# Patient Record
Sex: MALE | Race: WHITE | NOT HISPANIC OR LATINO | URBAN - METROPOLITAN AREA
[De-identification: names, ages, dates, MRNs, and addresses within clinical notes are randomized per-mention and may not be internally consistent; named-entity substitution may affect disease eponyms.]

---

## 2017-06-03 ENCOUNTER — INPATIENT (INPATIENT)
Facility: HOSPITAL | Age: 22
LOS: 9 days | Discharge: HOME | End: 2017-06-13
Attending: PSYCHIATRY & NEUROLOGY | Admitting: PSYCHIATRY & NEUROLOGY
Payer: COMMERCIAL

## 2017-06-03 VITALS — WEIGHT: 259.93 LBS | HEIGHT: 73 IN

## 2017-06-03 DIAGNOSIS — F12.10 CANNABIS ABUSE, UNCOMPLICATED: ICD-10-CM

## 2017-06-03 DIAGNOSIS — F31.13 BIPOLAR DISORDER, CURRENT EPISODE MANIC WITHOUT PSYCHOTIC FEATURES, SEVERE: ICD-10-CM

## 2017-06-03 DIAGNOSIS — R69 ILLNESS, UNSPECIFIED: ICD-10-CM

## 2017-06-03 LAB
ALBUMIN SERPL ELPH-MCNC: 4.5 G/DL — SIGNIFICANT CHANGE UP (ref 3.3–5)
ALP SERPL-CCNC: 74 U/L — SIGNIFICANT CHANGE UP (ref 40–120)
ALT FLD-CCNC: 54 U/L — SIGNIFICANT CHANGE UP (ref 12–78)
AMPHET UR-MCNC: NEGATIVE — SIGNIFICANT CHANGE UP
ANION GAP SERPL CALC-SCNC: 6 MMOL/L — SIGNIFICANT CHANGE UP (ref 5–17)
APAP SERPL-MCNC: < 2 UG/ML (ref 10–30)
APPEARANCE UR: (no result)
AST SERPL-CCNC: 31 U/L — SIGNIFICANT CHANGE UP (ref 15–37)
BACTERIA # UR AUTO: (no result)
BARBITURATES UR SCN-MCNC: NEGATIVE — SIGNIFICANT CHANGE UP
BASOPHILS # BLD AUTO: 0.1 K/UL — SIGNIFICANT CHANGE UP (ref 0–0.2)
BASOPHILS NFR BLD AUTO: 1.2 % — SIGNIFICANT CHANGE UP (ref 0–2)
BENZODIAZ UR-MCNC: NEGATIVE — SIGNIFICANT CHANGE UP
BILIRUB SERPL-MCNC: 0.6 MG/DL — SIGNIFICANT CHANGE UP (ref 0.2–1.2)
BILIRUB UR-MCNC: NEGATIVE — SIGNIFICANT CHANGE UP
BUN SERPL-MCNC: 12 MG/DL — SIGNIFICANT CHANGE UP (ref 7–23)
CALCIUM SERPL-MCNC: 9.5 MG/DL — SIGNIFICANT CHANGE UP (ref 8.5–10.1)
CHLORIDE SERPL-SCNC: 108 MMOL/L — SIGNIFICANT CHANGE UP (ref 96–108)
CO2 SERPL-SCNC: 28 MMOL/L — SIGNIFICANT CHANGE UP (ref 22–31)
COCAINE METAB.OTHER UR-MCNC: NEGATIVE — SIGNIFICANT CHANGE UP
COLOR SPEC: YELLOW — SIGNIFICANT CHANGE UP
COMMENT - URINE: SIGNIFICANT CHANGE UP
CREAT SERPL-MCNC: 0.96 MG/DL — SIGNIFICANT CHANGE UP (ref 0.5–1.3)
DIFF PNL FLD: NEGATIVE — SIGNIFICANT CHANGE UP
EOSINOPHIL # BLD AUTO: 0.2 K/UL — SIGNIFICANT CHANGE UP (ref 0–0.5)
EOSINOPHIL NFR BLD AUTO: 2.1 % — SIGNIFICANT CHANGE UP (ref 0–6)
EPI CELLS # UR: SIGNIFICANT CHANGE UP
ETHANOL SERPL-MCNC: <10 MG/DL — SIGNIFICANT CHANGE UP (ref 0–10)
GLUCOSE SERPL-MCNC: 88 MG/DL — SIGNIFICANT CHANGE UP (ref 70–99)
GLUCOSE UR QL: NEGATIVE MG/DL — SIGNIFICANT CHANGE UP
HCT VFR BLD CALC: 50.2 % — HIGH (ref 39–50)
HGB BLD-MCNC: 16.7 G/DL — SIGNIFICANT CHANGE UP (ref 13–17)
KETONES UR-MCNC: NEGATIVE — SIGNIFICANT CHANGE UP
LEUKOCYTE ESTERASE UR-ACNC: NEGATIVE — SIGNIFICANT CHANGE UP
LITHIUM SERPL-MCNC: 1.1 MMOL/L — SIGNIFICANT CHANGE UP (ref 0.6–1.2)
LYMPHOCYTES # BLD AUTO: 1.7 K/UL — SIGNIFICANT CHANGE UP (ref 1–3.3)
LYMPHOCYTES # BLD AUTO: 14.8 % — SIGNIFICANT CHANGE UP (ref 13–44)
MCHC RBC-ENTMCNC: 28.4 PG — SIGNIFICANT CHANGE UP (ref 27–34)
MCHC RBC-ENTMCNC: 33.3 GM/DL — SIGNIFICANT CHANGE UP (ref 32–36)
MCV RBC AUTO: 85.2 FL — SIGNIFICANT CHANGE UP (ref 80–100)
METHADONE UR-MCNC: NEGATIVE — SIGNIFICANT CHANGE UP
MONOCYTES # BLD AUTO: 0.8 K/UL — SIGNIFICANT CHANGE UP (ref 0–0.9)
MONOCYTES NFR BLD AUTO: 6.9 % — SIGNIFICANT CHANGE UP (ref 2–14)
NEUTROPHILS # BLD AUTO: 8.7 K/UL — HIGH (ref 1.8–7.4)
NEUTROPHILS NFR BLD AUTO: 74.9 % — SIGNIFICANT CHANGE UP (ref 43–77)
NITRITE UR-MCNC: NEGATIVE — SIGNIFICANT CHANGE UP
OPIATES UR-MCNC: NEGATIVE — SIGNIFICANT CHANGE UP
PCP SPEC-MCNC: SIGNIFICANT CHANGE UP
PCP UR-MCNC: NEGATIVE — SIGNIFICANT CHANGE UP
PH UR: 8 — SIGNIFICANT CHANGE UP (ref 5–8)
PLATELET # BLD AUTO: 246 K/UL — SIGNIFICANT CHANGE UP (ref 150–400)
POTASSIUM SERPL-MCNC: 4 MMOL/L — SIGNIFICANT CHANGE UP (ref 3.5–5.3)
POTASSIUM SERPL-SCNC: 4 MMOL/L — SIGNIFICANT CHANGE UP (ref 3.5–5.3)
PROT SERPL-MCNC: 7.6 GM/DL — SIGNIFICANT CHANGE UP (ref 6–8.3)
PROT UR-MCNC: NEGATIVE MG/DL — SIGNIFICANT CHANGE UP
RBC # BLD: 5.89 M/UL — HIGH (ref 4.2–5.8)
RBC # FLD: 12.5 % — SIGNIFICANT CHANGE UP (ref 10.3–14.5)
RBC CASTS # UR COMP ASSIST: SIGNIFICANT CHANGE UP /HPF (ref 0–4)
SALICYLATES SERPL-MCNC: 1.9 MG/DL — LOW (ref 2.8–20)
SODIUM SERPL-SCNC: 142 MMOL/L — SIGNIFICANT CHANGE UP (ref 135–145)
SP GR SPEC: 1.01 — SIGNIFICANT CHANGE UP (ref 1.01–1.02)
THC UR QL: POSITIVE — SIGNIFICANT CHANGE UP
UROBILINOGEN FLD QL: NEGATIVE MG/DL — SIGNIFICANT CHANGE UP
WBC # BLD: 11.6 K/UL — HIGH (ref 3.8–10.5)
WBC # FLD AUTO: 11.6 K/UL — HIGH (ref 3.8–10.5)
WBC UR QL: SIGNIFICANT CHANGE UP

## 2017-06-03 PROCEDURE — 93010 ELECTROCARDIOGRAM REPORT: CPT

## 2017-06-03 PROCEDURE — 99285 EMERGENCY DEPT VISIT HI MDM: CPT

## 2017-06-03 PROCEDURE — 90792 PSYCH DIAG EVAL W/MED SRVCS: CPT | Mod: GT

## 2017-06-03 RX ORDER — HALOPERIDOL DECANOATE 100 MG/ML
5 INJECTION INTRAMUSCULAR EVERY 6 HOURS
Qty: 0 | Refills: 0 | Status: DISCONTINUED | OUTPATIENT
Start: 2017-06-04 | End: 2017-06-13

## 2017-06-03 RX ORDER — IBUPROFEN 200 MG
600 TABLET ORAL EVERY 6 HOURS
Qty: 0 | Refills: 0 | Status: DISCONTINUED | OUTPATIENT
Start: 2017-06-04 | End: 2017-06-06

## 2017-06-03 RX ORDER — NICOTINE POLACRILEX 2 MG
2 GUM BUCCAL
Qty: 0 | Refills: 0 | Status: DISCONTINUED | OUTPATIENT
Start: 2017-06-04 | End: 2017-06-13

## 2017-06-03 RX ORDER — DIPHENHYDRAMINE HCL 50 MG
50 CAPSULE ORAL EVERY 6 HOURS
Qty: 0 | Refills: 0 | Status: DISCONTINUED | OUTPATIENT
Start: 2017-06-04 | End: 2017-06-13

## 2017-06-03 RX ORDER — HALOPERIDOL DECANOATE 100 MG/ML
5 INJECTION INTRAMUSCULAR ONCE
Qty: 0 | Refills: 0 | Status: COMPLETED | OUTPATIENT
Start: 2017-06-03 | End: 2017-06-03

## 2017-06-03 RX ORDER — TRAZODONE HCL 50 MG
50 TABLET ORAL AT BEDTIME
Qty: 0 | Refills: 0 | Status: DISCONTINUED | OUTPATIENT
Start: 2017-06-04 | End: 2017-06-13

## 2017-06-03 RX ORDER — LITHIUM CARBONATE 300 MG/1
600 TABLET, EXTENDED RELEASE ORAL AT BEDTIME
Qty: 0 | Refills: 0 | Status: DISCONTINUED | OUTPATIENT
Start: 2017-06-04 | End: 2017-06-13

## 2017-06-03 RX ORDER — NICOTINE POLACRILEX 2 MG
1 GUM BUCCAL DAILY
Qty: 0 | Refills: 0 | Status: DISCONTINUED | OUTPATIENT
Start: 2017-06-04 | End: 2017-06-13

## 2017-06-03 RX ORDER — ACETAMINOPHEN 500 MG
650 TABLET ORAL EVERY 6 HOURS
Qty: 0 | Refills: 0 | Status: DISCONTINUED | OUTPATIENT
Start: 2017-06-04 | End: 2017-06-13

## 2017-06-03 RX ORDER — NICOTINE POLACRILEX 2 MG
1 GUM BUCCAL DAILY
Qty: 0 | Refills: 0 | Status: DISCONTINUED | OUTPATIENT
Start: 2017-06-03 | End: 2017-06-13

## 2017-06-03 RX ORDER — LITHIUM CARBONATE 300 MG/1
300 TABLET, EXTENDED RELEASE ORAL DAILY
Qty: 0 | Refills: 0 | Status: DISCONTINUED | OUTPATIENT
Start: 2017-06-04 | End: 2017-06-12

## 2017-06-03 RX ADMIN — Medication 1 PATCH: at 21:59

## 2017-06-03 RX ADMIN — HALOPERIDOL DECANOATE 5 MILLIGRAM(S): 100 INJECTION INTRAMUSCULAR at 23:08

## 2017-06-03 NOTE — ED BEHAVIORAL HEALTH ASSESSMENT NOTE - HPI (INCLUDE ILLNESS QUALITY, SEVERITY, DURATION, TIMING, CONTEXT, MODIFYING FACTORS, ASSOCIATED SIGNS AND SYMPTOMS)
Patient is a single, noncaregiver, 22 yo  male, works part-time as PT aide, domiciled with family in a private home, with Bipolar Disorder Type I, previous inpatient psychiatric admissions, long history of Cannabis Abuse (ongoing), with no hx of aggression/violence, with no hx of suicide attempts; hx of minor legal issue (traffic violations),currently seeing a private psychiatrist and a therapist,  BIB parents today from home for   paranoia, manic symptoms, impulsive & disorganized behavior and thinking secondary to self-discontinuing his psychiatric medications 6 months ago, 2 weeks ago returned from [place] (visited sister), and went back to see his psychiatrist and received Abilify Maintena on 5/25/17 and started to take lithium again.     Patient is in an apparent upbeat mood, circumstantial (even tangential at times) and overinclusive with information requiring at times to be redirected/questions repeated. Patient admits that he has been paranoid about neighbors and friends like thinking they have been in his house moving things). he went to [employer] yesterday and bought clothing "I don't know for how much money I have left."      Patient reports to be medication compliant - lithium theraputic at 1.1; received his monthly Abilify Maintenna shot. Admits to cannabis use and last used last night when he was out with friends. 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, with Bipolar Disorder Type I, previous inpatient psychiatric admissions (most recently at [facility] about 6months ago), long history of Cannabis Abuse (ongoing), with no hx of aggression/violence, with no hx of suicide attempts; hx of minor legal issue (traffic violations),currently seeing a private psychiatrist and a therapist, BIB parents today from home for paranoia, manic symptoms, impulsive & disorganized behavior secondary to self-discontinuing his psychiatric medications 6 months ago, taking a Trans-Atlantic flight to [place] where he did a "lot of hash", returned home 2 weeks ago, did not feel well and went back to see his psychiatrist and received Abilify Maintena on 5/25/17 and started to take lithium again.     Patient is in an apparent upbeat mood, circumstantial initially , then becoming tangential as the assessment progresses. He was also overinclusive with information requiring at times to be redirected/questions repeated. Patient admits that he has been paranoid about neighbors and friends like thinking they have been in his house moving things. He went to [employer] yesterday and bought several items of clothing which he did not need, wore one flip-flop he bought out somewhere - "I don't know for how much money I have left." Patient talked about doing "a lot of hash" in [place], going to coffee houses, visiting [facility] and then bringing cheese back in the airplane.  Patient denies suicidal/homicidal ideation.     Patient reports to be medication compliant since he returned from his [place] trip - lithium theraputic at 1.1; received his monthly Abilify Maintenna shot. Admits to cannabis use and last used last night when he was out with friends. 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, with Bipolar Disorder Type I, previous inpatient psychiatric admissions (most recently at [facility] about 6months ago), long history of Cannabis Abuse (ongoing), with no hx of aggression/violence, with no hx of suicide attempts; hx of minor legal issue (traffic violations),currently seeing a private psychiatrist and a therapist, BIB parents today from home for paranoia, manic symptoms, impulsive & disorganized behavior secondary to self-discontinuing his psychiatric medications 6 months ago, taking a Trans-Atlantic flight to [email or web address] where he did a "lot of hash", returned home 2 weeks ago, did not feel well and went back to see his psychiatrist and received Abilify Maintena on 5/25/17 and started to take lithium again.     Patient is in an apparent upbeat mood, circumstantial initially , then becoming tangential as the assessment progresses. He was also overinclusive with information requiring at times to be redirected/questions repeated. Patient admits that he has been paranoid about neighbors and friends like thinking they have been in his house moving things. He went to [employer] yesterday and bought several items of clothing which he did not need, wore one flip-flop he bought out somewhere - "I don't know for how much money I have left." Patient went to multiple garrett earlier and attempted to open and close credit cards. Patient talked about doing "a lot of hash" in [place], going to coffee [employer], visiting [facility] and then bringing cheese back in the airplane.  Patient denies suicidal/homicidal ideation.     Patient reports to be medication compliant since he returned from his [place] trip - lithium theraputic at 1.1; received his monthly Abilify Maintenna shot. Admits to cannabis use and last used last night when he was out with friends. (UTOX '+" for cannabis)     COLLATERAL FROM PARENT : please see separate BH note (NOTE; father has guns at home which he says are locked in a safety box he has acces to. Patient has not access to this. As per father, he is planning on getting the guns out of the house/ Would double check prior to discharge)

## 2017-06-03 NOTE — ED BEHAVIORAL HEALTH ASSESSMENT NOTE - OTHER PAST PSYCHIATRIC HISTORY (INCLUDE DETAILS REGARDING ONSET, COURSE OF ILLNESS, INPATIENT/OUTPATIENT TREATMENT)
- 1st inpatient psych admission at age 15; stays at [facility] (last 3 stays)  - seeing Dr Sanders (psychiatrist) and Dr Judge (therapist) - 1st inpatient psych admission at age 15; one stay at [facility] (last 3 stays)  - long history of cannabis use  - no hx of aggression/violence/suicide attempts   - seeing Dr Sanders (private psychiatrist) and Dr Judge (therapist) at this time

## 2017-06-03 NOTE — ED BEHAVIORAL HEALTH ASSESSMENT NOTE - SUMMARY
Patient is a 20 yo male with Bipolar I, prior psychiatric admissions, with no hx of suicide attempts/aggression/violence/serious legal issues presenting in a manic state with increased goal directed activity, inattentiveness, affect/mood lability, highly impulsive behavior including shopping sprees/spending sprees, poor sleep, paranoid thinking, decline in baseline functioning (unable to work; sent home by boss), with poor judgment and insight, with continued substance use (likely to have been more lately due to vacation in [place]) who is unable to safely meet his basic needs in his current state and needs inpatient level of care. Emergency 9.39 criteria met. Worthy to note that Patient's presentation is also influenced by his medication non-compliance for 6 months (even if he took medications again in the last week, it is not enough to compensate for this long of period of non-treatment), ongoing substance use which likely was more during his [place] coffee shop visits as well as the sleep changes that go along with a transatlantic trip/flight.

## 2017-06-03 NOTE — ED ADULT NURSE REASSESSMENT NOTE - NS ED NURSE REASSESS COMMENT FT1
Received patient from Mee MCINTYRE @ 8113- Pt. is resting in bed- Pt. expressing feelings of Paranoia and Panic, medicated as ordered. Tele psych called and contacted parents. Pending admission to Psych Facility- Pt. not aggressive at this time- CO continues for safety- Will cont to monitor pt. closely- safety maintained
Bloodwork sent.  Patient on 1:1 observation.  EKG done will continue to monitor pt condition.

## 2017-06-03 NOTE — ED BEHAVIORAL HEALTH ASSESSMENT NOTE - PSYCHIATRIC ISSUES AND PLAN (INCLUDE STANDING AND PRN MEDICATION)
lithium 300mg PO in am and 600mg PO in hs (confirm dose; Dad only knew "one pill in am and 2 pills at hs); on Abilify Maintena which he got last week; + PRNs (haldo, Ativan, Benadryl)

## 2017-06-03 NOTE — ED BEHAVIORAL HEALTH ASSESSMENT NOTE - DETAILS
gained 40 pounds on depakote - reported excessive hunger/appetite parents aware of admission  and are in agreement father -depression Unit nurse called for hand off; case signed out to [facility] overnight team for morning hand-off

## 2017-06-03 NOTE — ED BEHAVIORAL HEALTH ASSESSMENT NOTE - DESCRIPTION
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

## 2017-06-03 NOTE — ED ADULT NURSE NOTE - OBJECTIVE STATEMENT
patient states he started new medications, was off meds for a while. states he has been having paranoid thoughts, and events of today very manic. patient went to several garrett, stating he was a private client and needed to speak with his , that he has a million dollar idea, awaiting a new job. patient with flight of ideas. father at bedside, one to one in place for safety. patient denies suicidal or homicidal thoughts.

## 2017-06-03 NOTE — ED ADULT TRIAGE NOTE - CHIEF COMPLAINT QUOTE
pt's father states patient has been acting out, pt has not had suicidal ideation, but seems to be close to having a manic episode, pt has history of bipolar disorder.

## 2017-06-03 NOTE — ED BEHAVIORAL HEALTH NOTE - BEHAVIORAL HEALTH NOTE
Telepsychiatry Encounter  I have visualized that the patient is on an arms-length 1:1.  I have visualized that the patient is in a private space.  I have confirmed with the patient that they understanding and agree to the evaluation being performed via Telepsychiatry.  I have discussed the above with Telepsychiatry Attending Dr. Harden     Records reviewed: CVM, Filiberto, [facility]  Collateral Contact: Lloyd Servin   -NUMBER: [place] ED Telephone     -RELATIONSHIP: Father     -RELIABILITY: No concerns   -OPINION RE PATIENT RELIABILITY: No concerns   -OPINION RE CONCERN FOR DANGEROUSNESS: No concerns     -AFTERCARE ROLE: Willing to assist pt in all areas as needed.   -PSYCHOEDUCATION: Reviewed role of Emergency department, nature of involuntary vs. voluntary hospitalization, support groups for caregivers.    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     -DEPENDENTS: None    -HPI/PAST PSYCH: Pt has a history of four psychiatric hospitalizations since the age of 15, 1 at [facility] and the last three at [facility]. Last hospitalization was October 2015. Pt has no history of SA and has history of alcohol and past daily marijuana use. Upon discharge from  pt began receiving psychiatric services with Dr. Sanders and therapy with psychologist Dr. Delgado. About 6 months ago, pt decided to stop taking his medications and only receive therapy. Pts father reports pt was doing well up until about 2 weeks ago after he came home from visiting his sister in [place] with his brother. Pts father reports at baseline pt is anxious and usually becomes more anxious when getting prepared to go on a trip. When he returned home from this trip, father reported pt began sleeping less and less and became manic. He became grandiose reporting he felt he was Jamil, then became more paranoid reporting that people were taking his belongings in the home asking for the house locks to be changed. On the 25th, pt was able to recognize his symptoms and asked to return to Dr. Sanders. On this day, he received an Abilify injection and was prescribed Lithium and Klonopin. Pts father reports that he believes pt is taking his medication however, gets confused frequently on the amount he should take. On Friday, pt was sent home from work after his boss reported he was “moving too quickly”. Today, pt woke up and went to multiple garrett and attempted to open and close credit cards. He was also paranoid moving all of his belongings in the home asking that the locks be changed as he believed someone was coming into the home and taking his belongings. Pt was BIB his parents to the ED.     -SUICIDALITY: None, as per pts father     -VIOLENCE: None, as per pts father     -ARRESTS: As per pts father, pt received one public urination ticket and one ticket for having marijuana in his car.     -SUBSTANCE: Daily past use of marijuana and uses alcohol on occasion.     -MEDICAL: None      -TODAY’S ED VISIT: Pt presented in the ED as calm, cooperative and behaviorally controlled.     ADDITIONAL HISTORY PROVIDED BY: Lloyd Servin   -HPI:  Pt has a history of four psychiatric hospitalizations since the age of 15, 1 at [facility] and the last three at [facility]. Last hospitalization was October 2015. Pt has no history of SA and has history of alcohol and past daily marijuana use. Upon discharge from  pt began receiving psychiatric services with Dr. Sanders and therapy with psychologist Dr. Delgado. About 6 months ago, pt decided to stop taking his medications and only receive therapy. Pts father reports pt was doing well up until about 2 weeks ago after he came home from visiting his sister in [place] with his brother. Pts father reports at baseline pt is anxious and usually becomes more anxious when getting prepared to go on a trip. When he returned home from this trip, father reported pt began sleeping less and less and became manic. He became grandiose reporting he felt he was Jamil, then became more paranoid reporting that people were taking his belongings in the home asking for the house locks to be changed. On the 25th, pt was able to recognize his symptoms and asked to return to Dr. Sanders. On this day, he received an Abilify injection and was prescribed Lithium and Klonopin. Pts father reports that he believes pt is taking his medication however, gets confused frequently on the amount he should take. On Friday, pt was sent home from work after his boss reported he was “moving too quickly”. Today, pt woke up and went to multiple garrett and attempted to open and close credit cards. He was also paranoid moving all of his belongings in the home asking that the locks be changed as he believed someone was coming into the home and taking his belongings. Pt was BIB his parents to the ED.     -FAMILY HISTORY: Pts father has depression.     -SOCIAL HISTORY:  No access to guns or weapons. Pts father reports that there are rifles in the home that are locked away in the safe. He reports pt does not have access to the key and that he will be removing the safe out of the home after today.   -DISPOSITION: ADMIT-9.39    I have discussed the above information with Telepsychiatry Attending Dr. Harden

## 2017-06-03 NOTE — ED ADULT NURSE NOTE - NS ED NURSE REPORT GIVEN TO FT
Ria Casarez RN - Pt. rewanded as per Protocol- Parents states belongings were taken by them- No acute or physical distress noted at this time- 939 form filled by Dr. Pendleton- Safety maintained
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
## 2017-06-03 NOTE — ED BEHAVIORAL HEALTH ASSESSMENT NOTE - OTHER
parents CVM on the fasetr side but not pressured "I am fine" describes paranoia limited recent trans-atlantic trip with jet lag/sleep-wake disturbance on the faster side but not overtky pressured

## 2017-06-03 NOTE — ED BEHAVIORAL HEALTH ASSESSMENT NOTE - SUICIDE RISK FACTORS
Mood episode/Substance abuse/dependence Unable to engage in safety planning/Substance abuse/dependence/Highly impulsive behavior/Mood episode

## 2017-06-03 NOTE — ED PROVIDER NOTE - OBJECTIVE STATEMENT
20 y/o male with h/o bipolar disorder BIB father for increasing sean and paranoia over the past week.  Pt was last admitted for 10 days at [facility] about 6 months ago for the same.  He has been on and off meds but recently given injection of Abilify and restarted on Lithium.  No SI or HI.  No somatic complaints.

## 2017-06-03 NOTE — ED BEHAVIORAL HEALTH ASSESSMENT NOTE - DESCRIPTION (FIRST USE, LAST USE, QUANTITY, FREQUENCY, DURATION)
years of cigarette use; currently has nicotine patch on social drinker; last drank 2 beers with friends last night; BAL < 10 in ED today. Denies hx of abuse.dependence symptoms reports at most using "everyday, all day"; decreased some use when working tried once in school; denies subsequent use

## 2017-06-03 NOTE — ED BEHAVIORAL HEALTH ASSESSMENT NOTE - SUICIDE PROTECTIVE FACTORS
Identifies reasons for living/Supportive social network or family/Positive therapeutic relationships/Future oriented/Responsibility to family and others

## 2017-06-03 NOTE — ED PROVIDER NOTE - DETAILS:
I, Wai Dowell, performed the initial face to face bedside interview with this patient regarding history of present illness, review of symptoms and relevant past medical, social and family history.  I completed an independent physical examination.  I was the initial provider who evaluated this patient.  The history, relevant review of systems, past medical and surgical history, medical decision making, and physical examination was documented by the scribe in my presence and I attest to the accuracy of the documentation.

## 2017-06-03 NOTE — ED BEHAVIORAL HEALTH ASSESSMENT NOTE - SUBSTANCE ISSUES AND PLAN (INCLUDE STANDING AND PRN MEDICATION)
low clinical indication for CINA/CIWA; no hx of withdrawals; cannabis use only low clinical indication for CINA/CIWA; no hx of withdrawals; cannabis use only. (NOTE; father has guns at home which he says are locked in a safety box he has acces to. Patient has not access to this. As per father, he is planning on getting the guns out of the house/ Would double check prior to discharge)

## 2017-06-03 NOTE — ED BEHAVIORAL HEALTH ASSESSMENT NOTE - RISK ASSESSMENT
Currently at high risk to self as he is in a manic episode, with increased goal directed activity, inattentiveness, affect/mood lability, highly impulsive behavior including shopping sprees/spending sprees, poor sleep, paranoid thinking, decline in baseline functioning (unable to work; sent home by boss), with poor judgment and insight, who is unable to safely meet his basic needs in his current state and needs inpatient level of care.

## 2017-06-03 NOTE — ED BEHAVIORAL HEALTH ASSESSMENT NOTE - MEDICAL ISSUES AND PLAN (INCLUDE STANDING AND PRN MEDICATION)
no active medical issues no active medical issues; nicotine patch with PRN gums as Patient is an avid cigarette user and has a patch on now

## 2017-06-04 RX ORDER — HALOPERIDOL DECANOATE 100 MG/ML
5 INJECTION INTRAMUSCULAR EVERY 12 HOURS
Qty: 0 | Refills: 0 | Status: DISCONTINUED | OUTPATIENT
Start: 2017-06-04 | End: 2017-06-06

## 2017-06-04 RX ADMIN — LITHIUM CARBONATE 300 MILLIGRAM(S): 300 TABLET, EXTENDED RELEASE ORAL at 08:28

## 2017-06-04 RX ADMIN — HALOPERIDOL DECANOATE 5 MILLIGRAM(S): 100 INJECTION INTRAMUSCULAR at 07:58

## 2017-06-04 RX ADMIN — Medication 1 PATCH: at 01:20

## 2017-06-04 RX ADMIN — Medication 2 MILLIGRAM(S): at 08:28

## 2017-06-04 RX ADMIN — Medication 2 MILLIGRAM(S): at 01:24

## 2017-06-04 RX ADMIN — Medication 1 PATCH: at 08:28

## 2017-06-04 RX ADMIN — HALOPERIDOL DECANOATE 5 MILLIGRAM(S): 100 INJECTION INTRAMUSCULAR at 15:57

## 2017-06-04 RX ADMIN — HALOPERIDOL DECANOATE 5 MILLIGRAM(S): 100 INJECTION INTRAMUSCULAR at 21:06

## 2017-06-04 RX ADMIN — Medication 50 MILLIGRAM(S): at 12:05

## 2017-06-04 RX ADMIN — LITHIUM CARBONATE 600 MILLIGRAM(S): 300 TABLET, EXTENDED RELEASE ORAL at 21:06

## 2017-06-04 NOTE — BEHAVIORAL HEALTH ASSESSMENT NOTE - SUMMARY
Patient is a 20 yo male with Bipolar I, prior psychiatric admissions, with no hx of suicide attempts/aggression/violence/serious legal issues presenting in a manic state with increased goal directed activity, inattentiveness, affect/mood lability, highly impulsive behavior including shopping sprees/spending sprees, poor sleep, paranoid thinking, decline in baseline functioning (unable to work; sent home by boss), with poor judgment and insight, with continued substance use (likely to have been more lately due to vacation in [place]) who is unable to safely meet his basic needs in his current state and needs inpatient level of care. Emergency 9.39 criteria met. Worthy to note that Patient's presentation is also influenced by his medication non-compliance for 6 months (even if he took medications again in the last week, it is not enough to compensate for this long of period of non-treatment), ongoing substance use which likely was more during his [place] coffee shop visits as well as the sleep changes that go along with a transatlantic trip/flight.

## 2017-06-04 NOTE — BEHAVIORAL HEALTH ASSESSMENT NOTE - NSBHCHARTREVIEWLAB_PSY_A_CORE FT
CBC Full  -  ( 2017 17:20 )  WBC Count : 11.6 K/uL  Hemoglobin : 16.7 g/dL  Hematocrit : 50.2 %  Platelet Count - Automated : 246 K/uL  Mean Cell Volume : 85.2 fl  Mean Cell Hemoglobin : 28.4 pg  Mean Cell Hemoglobin Concentration : 33.3 gm/dL  Auto Neutrophil # : 8.7 K/uL  Auto Lymphocyte # : 1.7 K/uL  Auto Monocyte # : 0.8 K/uL  Auto Eosinophil # : 0.2 K/uL  Auto Basophil # : 0.1 K/uL  Auto Neutrophil % : 74.9 %  Auto Lymphocyte % : 14.8 %  Auto Monocyte % : 6.9 %  Auto Eosinophil % : 2.1 %  Auto Basophil % : 1.2 %        142  |  108  |  12  ----------------------------<  88  4.0   |  28  |  0.96    Ca    9.5      2017 17:20    TPro  7.6  /  Alb  4.5  /  TBili  0.6  /  DBili  x   /  AST  31  /  ALT  54  /  AlkPhos  74  06-03      Urinalysis Basic - ( 2017 17:23 )    Color: Yellow / Appearance: very cloudy / S.015 / pH: x  Gluc: x / Ketone: Negative  / Bili: Negative / Urobili: Negative mg/dL   Blood: x / Protein: Negative mg/dL / Nitrite: Negative   Leuk Esterase: Negative / RBC: 0-2 /HPF / WBC 0-2   Sq Epi: x / Non Sq Epi: Occasional / Bacteria: Occasional

## 2017-06-04 NOTE — BEHAVIORAL HEALTH ASSESSMENT NOTE - PROBLEM SELECTOR PLAN 3
1. Pt to remain clean and sober  2.Ongoing pt psychoeducation related to inherent dangers of substance use d/o  3.Pt urged to attend therapy groups including those with focus on substance use d/o  4.AA meeting attendance in the evenings encouraged  5.Dual diagnosis treatment encouraged after pt DC from hospital

## 2017-06-04 NOTE — BEHAVIORAL HEALTH ASSESSMENT NOTE - PROBLEM SELECTOR PLAN 2
1. Ongoing pt psychoeducation regarding dangers of substance use and the importance of pt's remaining clean and sober  2.Pt urged to attend therapy groups, including those with a focus on substance use issues  3.To encourage pt to consider dual diagnosis treatment after DC to decrease pt risk of relapse and need for readmission to hospital.

## 2017-06-04 NOTE — BEHAVIORAL HEALTH ASSESSMENT NOTE - NSBHADMITIPSTRENGTH_PSY_A_CORE
Awareness of substance use issues/Creative/In good physical health/Has supportive interpersonal relationships with family, friends or peers/Financial stability/Has access to housing/residential stability/Intelligent/Attempting to realize his/her potential/Has vocational interests or hobbies

## 2017-06-04 NOTE — BEHAVIORAL HEALTH ASSESSMENT NOTE - NSBHMEDSOTHERFT_PSY_A_CORE
MEDICATIONS  (STANDING):  Current Inpatient Medications  nicotine -  14 mG/24Hr(s) Patch 1patch Transdermal daily  nicotine - 21 mG/24Hr(s) Patch 1Patch Transdermal daily  lithium 300milliGRAM(s) Oral daily  lithium 600milliGRAM(s) Oral at bedtime  haloperidol     Tablet 5milliGRAM(s) Oral every 12 hours    MEDICATIONS  (PRN):  nicotine  Polacrilex Gum 2milliGRAM(s) Oral every 2 hours PRN breakthrough cravings  acetaminophen   Tablet 650milliGRAM(s) Oral every 6 hours PRN For Temp greater than 38.5 C (101.3 F)  ibuprofen  Tablet 600milliGRAM(s) Oral every 6 hours PRN moderate pain  haloperidol     Tablet 5milliGRAM(s) Oral every 6 hours PRN agitation  haloperidol    Injectable 5milliGRAM(s) IntraMuscular every 6 hours PRN severe agitation / aggressive behavior  diphenhydrAMINE   Capsule 50milliGRAM(s) Oral every 6 hours PRN Extrapyramidal symptoms  or prophylaxis / agitation  diphenhydrAMINE   Injectable 50milliGRAM(s) IntraMuscular every 6 hours PRN Extrapyramidal symptoms or prophylaxis / agitation  LORazepam     Tablet 2milliGRAM(s) Oral every 6 hours PRN anxiety / agitation  LORazepam   Injectable 2milliGRAM(s) IntraMuscular every 6 hours PRN severe anxiety / agitation  traZODone 50milliGRAM(s) Oral at bedtime PRN insomnia

## 2017-06-04 NOTE — BEHAVIORAL HEALTH ASSESSMENT NOTE - NSBHCHARTREVIEWVS_PSY_A_CORE FT
Vital Signs Last 24 Hrs  T(C): 36.9, Max: 36.9 (06-05 @ 08:27)  T(F): 98.4, Max: 98.4 (06-05 @ 08:27)  HR: 89 (89 - 89)  BP: 142/78 (142/78 - 142/78)  BP(mean): --  RR: 16 (16 - 16)  SpO2: 99% (99% - 99%)

## 2017-06-04 NOTE — BEHAVIORAL HEALTH ASSESSMENT NOTE - NSBHVIOLRISKFACTORFT_PSY_A_CORE
TEL CALL TO PT'S FATHER 6/5/17 TO CONFIRM PT'S FATHER'S REPORT THAT GUNS IN THE HOME HAVE BEEN REMOVED .

## 2017-06-04 NOTE — BEHAVIORAL HEALTH ASSESSMENT NOTE - PATIENT'S CHIEF COMPLAINT
'I just need a shot of haldol then I will be on my way" " So how are things looking? I just need some Haldol in my butt so I can sleep and then I'm good. It's just my neighbor who is bugging my car and out to get me."

## 2017-06-04 NOTE — BEHAVIORAL HEALTH ASSESSMENT NOTE - PROBLEM SELECTOR PLAN 1
1. Resume Lithium 300 mg po q am and 600 mg po qhs   2.Recheck [place] level ( 12 hr level), CMP, CBC , TSH  3.Pt encouraged to attend therapy groups as tolerated  4.Haldol 5 mg po q 12 hrs standing begun  5.Pt gave verbal consent  to writer to contact pt's family and pt's outpatient provider Dr Sanders ( psychiatry) and Dr Merry Klein ( therapist)  6. WRITER TO CONFIRM WITH PT'S FATHER THAT ALL GUNS IN THE HOME HAVE BEEN REMOVED 1. Resume Lithium 300 mg po q am and 600 mg po qhs   2.Recheck [place] level ( 12 hr level), CMP, CBC , TSH  3.Pt encouraged to attend therapy groups as tolerated  4.Haldol 5 mg po q 12 hrs standing begun  5.Pt gave verbal consent  to writer to contact pt's family and pt's outpatient provider Dr Sanders ( psychiatry) and Dr Merry Klein ( therapist)  6. WRITER TO CONFIRM WITH PT'S FATHER THAT ALL GUNS IN THE HOME HAVE BEEN REMOVED  7.Disposition planning ongoing

## 2017-06-04 NOTE — BEHAVIORAL HEALTH ASSESSMENT NOTE - OTHER
CVM on the faster side but not overtky pressured "I am fine" describes paranoia superficially cooperative as pt remains thought disordered and illogical and unable to provide full cooperation coherent , spontaneous but in rushed illogical spurts speech near pressured and at times pt 'tripping' over his words joking, punning, then suddenly tearful pt denies though appears distracted and internally preoccupied at times. rapid and near pressured , difficult to interrupt, non goal-directed " I feel fine. I did a lot of weird stuff recently but it was because of my neighbor. My car is outside. Did you see it? " ( pt pointing to a stranger's car in the hospital parking lot)" paranoid and persecutory delusions, delusions of reference and somatic preoccupations

## 2017-06-04 NOTE — BEHAVIORAL HEALTH ASSESSMENT NOTE - NSBHADMITIPBHPROVFT_PSY_A_CORE
Tel call to Dr Sanders as above ( recent visit from pt on 5/25/17 for Abilify Maintena after pt self DC'd treatment x the past 6 months  Lithium and Klonopin also restarted on 5/25/17

## 2017-06-04 NOTE — BEHAVIORAL HEALTH ASSESSMENT NOTE - DESCRIPTION (FIRST USE, LAST USE, QUANTITY, FREQUENCY, DURATION)
years of cigarette use; currently has nicotine patch on social drinker; last drank 2 beers with friends last night; BAL < 10 in ED today. Denies hx of abuse.dependence symptoms reports at most using "everyday, all day"; decreased some use when working tried once in school; denies subsequent use social drinker; last drank 2 beers with friends on 6/3/17; BAL < 10 in ED today. Denies hx of abuse.dependence symptoms

## 2017-06-04 NOTE — BEHAVIORAL HEALTH ASSESSMENT NOTE - OTHER PAST PSYCHIATRIC HISTORY (INCLUDE DETAILS REGARDING ONSET, COURSE OF ILLNESS, INPATIENT/OUTPATIENT TREATMENT)
Pt has a history of four psychiatric hospitalizations since the age of 15, 1 at [facility] and the last three at [facility]. Last hospitalization was October 2015. Pt has no history of SA and has history of alcohol and past daily marijuana use. Upon discharge from  pt began receiving psychiatric services with Dr. Sanders and therapy with psychologist Dr. Delgado. About 6 months ago, pt decided to stop taking his medications and only receive therapy. Pts father reports pt was doing well up until about 2 weeks ago after he came home from visiting his sister in  [place] with his brother. Pt's father reports at baseline pt is anxious and usually becomes more anxious when getting prepared to go on a trip. When he returned home from this trip, father reported pt began sleeping less and less and became manic. He became grandiose reporting he felt he was Jamil, then became more paranoid reporting that people were taking his belongings in the home asking for the house locks to be changed. On the 25th, pt was able to recognize his symptoms and asked to return to Dr. Sanders. On this day, he received an Abilify injection and was prescribed Lithium and Klonopin. Pt's father reports that he believes pt is taking his medication however, gets confused frequently on the amount he should take. On Friday 6/2/17  the  pt was sent home from work after his boss reported he was “moving too quickly”. On 6/3/17, the day of admission to , the  pt woke up and went to multiple garrett and attempted to open and close credit cards. He was also paranoid , moving all of his belongings in the home asking that the locks be changed as he believed someone was coming into the home and taking his belongings. Pt with impaired judgment and minimal insight into his illness and the need for treatment.

## 2017-06-04 NOTE — BEHAVIORAL HEALTH ASSESSMENT NOTE - DETAILS
gained 40 pounds on depakote - reported excessive hunger/appetite father -depression gained 40 pounds on Depakote - reported excessive hunger/appetite father -reported h/o depression

## 2017-06-04 NOTE — BEHAVIORAL HEALTH ASSESSMENT NOTE - PROBLEM SELECTOR PLAN 4
1.Ongoing pt psychoeducation and support as to importance of treatment compliance in order to decrease the risk of clinical relapse and need for readmission to hospital.  2.Pt encouraged to attend therapy groups including safety planning and diagnosis, medication treatment options etc

## 2017-06-04 NOTE — BEHAVIORAL HEALTH ASSESSMENT NOTE - THOUGHT PROCESS
Overinclusive/Circumstantial/Tangential Disorganized/Flight of ideas/Illogical/Impaired reasoning/Tangential/Overinclusive/Circumstantial

## 2017-06-04 NOTE — BEHAVIORAL HEALTH ASSESSMENT NOTE - NSBHADMITMEDEDUDETAILS_A_CORE FT
face to face informed consent process begun with pt as to risks/ benefits of Lithium, Abilify and Haldol  with discussion of possible switch to Haldol PO and decanoate rather than Abilify as pt more amenable to Haldol treatment

## 2017-06-04 NOTE — BEHAVIORAL HEALTH ASSESSMENT NOTE - NSBHVIOLRISKFACTORS_PSY_A_CORE
Affective dysregulation/Firearm/weapon access/Other.../Substance abuse/Impulsivity/Feeling of being under threat and being unable to control threat/Noncompliance with treatment

## 2017-06-04 NOTE — BEHAVIORAL HEALTH ASSESSMENT NOTE - NSBHADMITTHERAPIESTARGET_PSY_A_CORE FT
to stabilize mood  , to encourage pt to maintain a clean and sober life style  to reenforce  importance for pt treatment compliance

## 2017-06-04 NOTE — BEHAVIORAL HEALTH ASSESSMENT NOTE - NSBHVIOLPROTECTFT_PSY_A_CORE
Pt responds well to treatment but pt is noncompliant with treatment and then clinically relapses as a result

## 2017-06-04 NOTE — BEHAVIORAL HEALTH ASSESSMENT NOTE - NSBHSUICPROTECTFACT_PSY_A_CORE
Positive therapeutic relationships/Supportive social network or family/Responsibility to family and others/Identifies reasons for living/Future oriented/Engaged in work or school

## 2017-06-04 NOTE — BEHAVIORAL HEALTH ASSESSMENT NOTE - HPI (INCLUDE ILLNESS QUALITY, SEVERITY, DURATION, TIMING, CONTEXT, MODIFYING FACTORS, ASSOCIATED SIGNS AND SYMPTOMS)
Patient is a single, noncaregiver, 22 yo  male, works part-time as PT aide, domiciled with family in a private home, with Bipolar Disorder Type I, previous inpatient psychiatric admissions (most recently at [facility] about 6months ago), long history of Cannabis Abuse (ongoing), with no hx of aggression/violence, with no hx of suicide attempts; hx of minor legal issue (traffic violations),currently seeing a private psychiatrist and a therapist, BIB parents today from home for paranoia, manic symptoms, impulsive & disorganized behavior secondary to self-discontinuing his psychiatric medications 6 months ago, taking a Trans[employer] flight to [place] where he did a "lot of hash", returned home 2 weeks ago, did not feel well and went back to see his psychiatrist and received Abilify Maintena on 5/25/17 and started to take lithium again.     Patient is in an apparent upbeat mood, circumstantial initially , then becoming tangential as the assessment progresses. He was also overinclusive with information requiring at times to be redirected/questions repeated. Patient admits that he has been paranoid about neighbors and friends like thinking they have been in his house moving things. He went to [employer] yesterday and bought several items of clothing which he did not need, wore one flip-flop he bought out somewhere - "I don't know for how much money I have left." Patient went to multiple garrett earlier and attempted to open and close credit cards. Patient talked about doing "a lot of hash" in [place], going to coffee [employer], visiting [facility] and then bringing cheese back in the airplane.  Patient denies suicidal/homicidal ideation.     Patient reports to be medication compliant since he returned from his [place] trip - lithium theraputic at 1.1; received his monthly Abilify Maintenna shot. Admits to cannabis use and last used last night when he was out with friends. (UTOX '+" for cannabis)     COLLATERAL FROM PARENT : please see separate BH note (NOTE; father has guns at home which he says are locked in a safety box he has acces to. Patient has not access to this. As per father, he is planning on getting the guns out of the house/ Would double check prior to discharge) Patient is a single, non caregiver, 22 yo  male, works part-time as PT aide, domiciled with family in a private home, with Bipolar Disorder Type I, previous inpatient psychiatric admissions (most recently at [facility] about 6months ago), long history of Cannabis Abuse (ongoing), with no hx of aggression/violence, with no hx of suicide attempts; hx of minor legal issue (traffic violations),currently seeing a private psychiatrist and a therapist, BIB parents today from home for paranoia, manic symptoms, impulsive & disorganized behavior secondary to self-discontinuing his psychiatric medications 6 months ago, taking a Trans-Atlantic flight to [place] where he did a "lot of hash", returned home 2 weeks ago, did not feel well and went back to see his psychiatrist and received Abilify Maintena on 5/25/17 and started to take lithium again.     Patient is in an apparent upbeat mood, circumstantial initially , then becoming tangential as the assessment progresses. He was also overinclusive with information requiring at times to be redirected/questions repeated. Patient admits that he has been paranoid about neighbors and friends like thinking they have been in his house moving things. He went to [employer] yesterday and bought several items of clothing which he did not need, wore one flip-flop he bought out somewhere - "I don't know for how much money I have left." Patient went to multiple garrett earlier and attempted to open and close credit cards. Patient talked about doing "a lot of hash" in [place], going to coffee houses, visiting [facility] and then bringing cheese back in the airplane.  Patient denies suicidal/homicidal ideation.     Patient reports to be medication compliant since he returned from his [place] trip - lithium theraputic at 1.1; received his monthly Abilify Maintenna shot. Admits to cannabis use and last used last night when he was out with friends. (UTOX '+" for cannabis)     COLLATERAL FROM PARENT : please see separate  note (NOTE; father has guns at home which he says are locked in a safety box he has acces to. Patient has not access to this. As per father, he is planning on getting the guns out of the house/ Would double check prior to discharge)

## 2017-06-05 DIAGNOSIS — F10.10 ALCOHOL ABUSE, UNCOMPLICATED: ICD-10-CM

## 2017-06-05 DIAGNOSIS — Z91.19 PATIENT'S NONCOMPLIANCE WITH OTHER MEDICAL TREATMENT AND REGIMEN: ICD-10-CM

## 2017-06-05 DIAGNOSIS — Z91.14 PATIENT'S OTHER NONCOMPLIANCE WITH MEDICATION REGIMEN: ICD-10-CM

## 2017-06-05 DIAGNOSIS — F10.20 ALCOHOL DEPENDENCE, UNCOMPLICATED: ICD-10-CM

## 2017-06-05 DIAGNOSIS — F12.20 CANNABIS DEPENDENCE, UNCOMPLICATED: ICD-10-CM

## 2017-06-05 RX ADMIN — Medication 2 MILLIGRAM(S): at 15:56

## 2017-06-05 RX ADMIN — LITHIUM CARBONATE 300 MILLIGRAM(S): 300 TABLET, EXTENDED RELEASE ORAL at 08:23

## 2017-06-05 RX ADMIN — Medication 50 MILLIGRAM(S): at 22:32

## 2017-06-05 RX ADMIN — Medication 1 PATCH: at 08:27

## 2017-06-05 RX ADMIN — HALOPERIDOL DECANOATE 5 MILLIGRAM(S): 100 INJECTION INTRAMUSCULAR at 20:39

## 2017-06-05 RX ADMIN — HALOPERIDOL DECANOATE 5 MILLIGRAM(S): 100 INJECTION INTRAMUSCULAR at 08:27

## 2017-06-05 RX ADMIN — LITHIUM CARBONATE 600 MILLIGRAM(S): 300 TABLET, EXTENDED RELEASE ORAL at 20:40

## 2017-06-05 RX ADMIN — Medication 2 MILLIGRAM(S): at 11:49

## 2017-06-05 RX ADMIN — Medication 2 MILLIGRAM(S): at 23:34

## 2017-06-05 RX ADMIN — Medication 2 MILLIGRAM(S): at 10:25

## 2017-06-05 RX ADMIN — Medication 1 PATCH: at 08:24

## 2017-06-05 RX ADMIN — Medication 600 MILLIGRAM(S): at 09:20

## 2017-06-05 RX ADMIN — HALOPERIDOL DECANOATE 5 MILLIGRAM(S): 100 INJECTION INTRAMUSCULAR at 03:13

## 2017-06-05 RX ADMIN — Medication 600 MILLIGRAM(S): at 08:23

## 2017-06-06 RX ORDER — HALOPERIDOL DECANOATE 100 MG/ML
10 INJECTION INTRAMUSCULAR AT BEDTIME
Qty: 0 | Refills: 0 | Status: DISCONTINUED | OUTPATIENT
Start: 2017-06-06 | End: 2017-06-13

## 2017-06-06 RX ORDER — BENZTROPINE MESYLATE 1 MG
0.5 TABLET ORAL
Qty: 0 | Refills: 0 | Status: DISCONTINUED | OUTPATIENT
Start: 2017-06-06 | End: 2017-06-13

## 2017-06-06 RX ORDER — HALOPERIDOL DECANOATE 100 MG/ML
5 INJECTION INTRAMUSCULAR DAILY
Qty: 0 | Refills: 0 | Status: DISCONTINUED | OUTPATIENT
Start: 2017-06-07 | End: 2017-06-13

## 2017-06-06 RX ADMIN — LITHIUM CARBONATE 300 MILLIGRAM(S): 300 TABLET, EXTENDED RELEASE ORAL at 08:43

## 2017-06-06 RX ADMIN — Medication 2 MILLIGRAM(S): at 12:12

## 2017-06-06 RX ADMIN — HALOPERIDOL DECANOATE 5 MILLIGRAM(S): 100 INJECTION INTRAMUSCULAR at 08:43

## 2017-06-06 RX ADMIN — Medication 1 PATCH: at 08:44

## 2017-06-06 RX ADMIN — Medication 50 MILLIGRAM(S): at 20:35

## 2017-06-06 RX ADMIN — Medication 600 MILLIGRAM(S): at 05:51

## 2017-06-06 RX ADMIN — Medication 1 PATCH: at 08:46

## 2017-06-06 RX ADMIN — Medication 2 MILLIGRAM(S): at 19:07

## 2017-06-06 RX ADMIN — Medication 50 MILLIGRAM(S): at 11:11

## 2017-06-06 RX ADMIN — HALOPERIDOL DECANOATE 10 MILLIGRAM(S): 100 INJECTION INTRAMUSCULAR at 20:35

## 2017-06-06 RX ADMIN — LITHIUM CARBONATE 600 MILLIGRAM(S): 300 TABLET, EXTENDED RELEASE ORAL at 20:36

## 2017-06-06 NOTE — PROGRESS NOTE BEHAVIORAL HEALTH - OTHER
superficially cooperative as pt remains thought disordered and illogical and unable to provide full cooperation rapid and near pressured , difficult to interrupt, non goal-directed coherent , spontaneous but in rushed illogical spurts speech near pressured and at times pt 'tripping' over his words " I feel fine. I did a lot of weird stuff recently but it was because of my neighbor. My car is outside. Did you see it? " ( pt pointing to a stranger's car in the hospital parking lot)" joking, punning, then suddenly tearful paranoid and persecutory delusions, delusions of reference and somatic preoccupations pt denies though appears distracted and internally preoccupied at times.

## 2017-06-07 RX ADMIN — Medication 1 MILLIGRAM(S): at 13:53

## 2017-06-07 RX ADMIN — HALOPERIDOL DECANOATE 10 MILLIGRAM(S): 100 INJECTION INTRAMUSCULAR at 20:44

## 2017-06-07 RX ADMIN — LITHIUM CARBONATE 600 MILLIGRAM(S): 300 TABLET, EXTENDED RELEASE ORAL at 20:43

## 2017-06-07 RX ADMIN — Medication 650 MILLIGRAM(S): at 17:55

## 2017-06-07 RX ADMIN — HALOPERIDOL DECANOATE 5 MILLIGRAM(S): 100 INJECTION INTRAMUSCULAR at 08:38

## 2017-06-07 RX ADMIN — Medication 1 PATCH: at 08:38

## 2017-06-07 RX ADMIN — Medication 650 MILLIGRAM(S): at 09:21

## 2017-06-07 RX ADMIN — Medication 1 PATCH: at 08:39

## 2017-06-07 RX ADMIN — Medication 50 MILLIGRAM(S): at 08:38

## 2017-06-07 RX ADMIN — LITHIUM CARBONATE 300 MILLIGRAM(S): 300 TABLET, EXTENDED RELEASE ORAL at 08:38

## 2017-06-07 RX ADMIN — Medication 1 PATCH: at 12:41

## 2017-06-07 RX ADMIN — Medication 50 MILLIGRAM(S): at 22:38

## 2017-06-07 RX ADMIN — Medication 600 MILLIGRAM(S): at 06:21

## 2017-06-07 NOTE — PROGRESS NOTE BEHAVIORAL HEALTH - OTHER
superficially cooperative as pt remains thought disordered and illogical and unable to provide full cooperation rapid and near pressured , difficult to interrupt, non goal-directed coherent , spontaneous but in rushed illogical spurts speech near pressured and at times pt 'tripping' over his words " I feel good now. I want to get back to life." joking, punning, then suddenly tearful less overtly paranoid but still grandiose and recklessly blase as to his recent potentially dangerous impulsivity pt denies though appears distracted and internally preoccupied at times.

## 2017-06-07 NOTE — PROGRESS NOTE BEHAVIORAL HEALTH - NSBHCHARTREVIEWIMAGING_PSY_A_CORE FT
MEDICATIONS  (STANDING):  nicotine -  14 mG/24Hr(s) Patch 1patch Transdermal daily  nicotine - 21 mG/24Hr(s) Patch 1Patch Transdermal daily  lithium 300milliGRAM(s) Oral daily  lithium 600milliGRAM(s) Oral at bedtime  haloperidol     Tablet 10milliGRAM(s) Oral at bedtime  haloperidol     Tablet 5milliGRAM(s) Oral daily    MEDICATIONS  (PRN):  nicotine  Polacrilex Gum 2milliGRAM(s) Oral every 2 hours PRN breakthrough cravings  acetaminophen   Tablet 650milliGRAM(s) Oral every 6 hours PRN For Temp greater than 38.5 C (101.3 F)  haloperidol     Tablet 5milliGRAM(s) Oral every 6 hours PRN agitation  haloperidol    Injectable 5milliGRAM(s) IntraMuscular every 6 hours PRN severe agitation / aggressive behavior  diphenhydrAMINE   Capsule 50milliGRAM(s) Oral every 6 hours PRN Extrapyramidal symptoms  or prophylaxis / agitation  diphenhydrAMINE   Injectable 50milliGRAM(s) IntraMuscular every 6 hours PRN Extrapyramidal symptoms or prophylaxis / agitation  LORazepam   Injectable 2milliGRAM(s) IntraMuscular every 6 hours PRN severe anxiety / agitation  traZODone 50milliGRAM(s) Oral at bedtime PRN insomnia  benztropine 0.5milliGRAM(s) Oral two times a day PRN Extrapyramidal symptoms  LORazepam     Tablet 1milliGRAM(s) Oral every 6 hours PRN Anxiety

## 2017-06-08 RX ORDER — DIPHENHYDRAMINE HCL 50 MG
50 CAPSULE ORAL EVERY 6 HOURS
Qty: 0 | Refills: 0 | Status: DISCONTINUED | OUTPATIENT
Start: 2017-06-08 | End: 2017-06-08

## 2017-06-08 RX ADMIN — Medication 1 MILLIGRAM(S): at 09:21

## 2017-06-08 RX ADMIN — HALOPERIDOL DECANOATE 10 MILLIGRAM(S): 100 INJECTION INTRAMUSCULAR at 20:36

## 2017-06-08 RX ADMIN — HALOPERIDOL DECANOATE 5 MILLIGRAM(S): 100 INJECTION INTRAMUSCULAR at 09:22

## 2017-06-08 RX ADMIN — LITHIUM CARBONATE 300 MILLIGRAM(S): 300 TABLET, EXTENDED RELEASE ORAL at 09:21

## 2017-06-08 RX ADMIN — Medication 1 MILLIGRAM(S): at 20:39

## 2017-06-08 RX ADMIN — LITHIUM CARBONATE 600 MILLIGRAM(S): 300 TABLET, EXTENDED RELEASE ORAL at 20:37

## 2017-06-08 NOTE — PROGRESS NOTE BEHAVIORAL HEALTH - NSBHADMITIPOBSFT_PSY_A_CORE
management alert observation status for pt safety management alert observation status for pt safety in light of current manic psychosis with impaired judgment and exit seeking stance

## 2017-06-08 NOTE — PROGRESS NOTE BEHAVIORAL HEALTH - NSBHCHARTREVIEWIMAGING_PSY_A_CORE FT
MEDICATIONS  (STANDING):  nicotine -  14 mG/24Hr(s) Patch 1patch Transdermal daily  nicotine - 21 mG/24Hr(s) Patch 1Patch Transdermal daily  lithium 300milliGRAM(s) Oral daily  lithium 600milliGRAM(s) Oral at bedtime  haloperidol     Tablet 10milliGRAM(s) Oral at bedtime  haloperidol     Tablet 5milliGRAM(s) Oral daily    MEDICATIONS  (PRN):  nicotine  Polacrilex Gum 2milliGRAM(s) Oral every 2 hours PRN breakthrough cravings  acetaminophen   Tablet 650milliGRAM(s) Oral every 6 hours PRN For Temp greater than 38.5 C (101.3 F)  haloperidol     Tablet 5milliGRAM(s) Oral every 6 hours PRN agitation  haloperidol    Injectable 5milliGRAM(s) IntraMuscular every 6 hours PRN severe agitation / aggressive behavior  diphenhydrAMINE   Capsule 50milliGRAM(s) Oral every 6 hours PRN Extrapyramidal symptoms  or prophylaxis / agitation  diphenhydrAMINE   Injectable 50milliGRAM(s) IntraMuscular every 6 hours PRN Extrapyramidal symptoms or prophylaxis / agitation  LORazepam   Injectable 2milliGRAM(s) IntraMuscular every 6 hours PRN severe anxiety / agitation  traZODone 50milliGRAM(s) Oral at bedtime PRN insomnia  benztropine 0.5milliGRAM(s) Oral two times a day PRN Extrapyramidal symptoms  LORazepam     Tablet 1milliGRAM(s) Oral every 6 hours PRN Anxiety MEDICATIONS  (STANDING):  nicotine -  14 mG/24Hr(s) Patch 1patch Transdermal daily  nicotine - 21 mG/24Hr(s) Patch 1Patch Transdermal daily  lithium 300milliGRAM(s) Oral daily  lithium 600milliGRAM(s) Oral at bedtime  haloperidol     Tablet 10milliGRAM(s) Oral at bedtime  haloperidol     Tablet 5milliGRAM(s) Oral daily    MEDICATIONS  (PRN):  nicotine  Polacrilex Gum 2milliGRAM(s) Oral every 2 hours PRN breakthrough cravings  acetaminophen   Tablet 650milliGRAM(s) Oral every 6 hours PRN For Temp greater than 38.5 C (101.3 F)  haloperidol     Tablet 5milliGRAM(s) Oral every 6 hours PRN agitation  haloperidol    Injectable 5milliGRAM(s) IntraMuscular every 6 hours PRN severe agitation / aggressive behavior  diphenhydrAMINE   Capsule 50milliGRAM(s) Oral every 6 hours PRN Extrapyramidal symptoms  or prophylaxis / agitation  diphenhydrAMINE   Injectable 50milliGRAM(s) IntraMuscular every 6 hours PRN Extrapyramidal symptoms or prophylaxis / agitation  traZODone 50milliGRAM(s) Oral at bedtime PRN insomnia  benztropine 0.5milliGRAM(s) Oral two times a day PRN Extrapyramidal symptoms  LORazepam     Tablet 1milliGRAM(s) Oral every 8 hours PRN Agitation

## 2017-06-08 NOTE — PROGRESS NOTE BEHAVIORAL HEALTH - PROBLEM SELECTOR PLAN 3
1. Pt to remain clean and sober  2.Ongoing pt psychoeducation related to inherent dangers of substance use d/o  3.Pt urged to attend therapy groups including those with focus on substance use d/o  4.AA meeting attendance in the evenings encouraged  5.Dual diagnosis treatment encouraged after pt DC from hospital 1. Pt to remain clean and sober  2.Ongoing pt psychoeducation related to inherent dangers of substance use d/o  3.Pt urged to attend therapy groups including those with focus on substance use d/o  4.AA meeting attendance in the evenings encouraged  5.Dual diagnosis treatment encouraged after pt DC from hospital  6. To continue taper of Ativan with plan to DC due to pt h/o significant substance use d/o

## 2017-06-09 RX ADMIN — Medication 50 MILLIGRAM(S): at 12:21

## 2017-06-09 RX ADMIN — Medication 1 MILLIGRAM(S): at 20:52

## 2017-06-09 RX ADMIN — Medication 50 MILLIGRAM(S): at 01:52

## 2017-06-09 RX ADMIN — LITHIUM CARBONATE 300 MILLIGRAM(S): 300 TABLET, EXTENDED RELEASE ORAL at 08:07

## 2017-06-09 RX ADMIN — Medication 0.5 MILLIGRAM(S): at 20:52

## 2017-06-09 RX ADMIN — Medication 650 MILLIGRAM(S): at 12:22

## 2017-06-09 RX ADMIN — HALOPERIDOL DECANOATE 5 MILLIGRAM(S): 100 INJECTION INTRAMUSCULAR at 08:07

## 2017-06-09 RX ADMIN — LITHIUM CARBONATE 600 MILLIGRAM(S): 300 TABLET, EXTENDED RELEASE ORAL at 21:06

## 2017-06-09 RX ADMIN — HALOPERIDOL DECANOATE 10 MILLIGRAM(S): 100 INJECTION INTRAMUSCULAR at 20:52

## 2017-06-09 RX ADMIN — Medication 1 MILLIGRAM(S): at 08:07

## 2017-06-09 NOTE — PROGRESS NOTE BEHAVIORAL HEALTH - OTHER
superficially cooperative as pt remains thought disordered and illogical and unable to provide full cooperation rapid and near pressured , difficult to interrupt, non goal-directed coherent , spontaneous but in rushed illogical spurts speech near pressured and at times pt 'tripping' over his words somewhat overly cheerful and inappropriately nonchalant less frequent joking, punning, then suddenly tearful less overtly paranoid but still  somewhat grandiose and recklessly blase as to his recent potentially dangerous impulsivity pt denies though appears distracted and internally preoccupied at times. slowly improving

## 2017-06-09 NOTE — PROGRESS NOTE BEHAVIORAL HEALTH - NSBHADMITIPOBSFT_PSY_A_CORE
management alert observation status for pt safety in light of current manic psychosis with impaired judgment and exit seeking stance

## 2017-06-09 NOTE — PROGRESS NOTE BEHAVIORAL HEALTH - PROBLEM SELECTOR PLAN 3
1. Pt to remain clean and sober  2.Ongoing pt psychoeducation related to inherent dangers of substance use d/o  3.Pt urged to attend therapy groups including those with focus on substance use d/o  4.AA meeting attendance in the evenings encouraged  5.Dual diagnosis treatment encouraged after pt DC from hospital  6. To continue taper of Ativan with plan to DC due to pt h/o significant substance use d/o

## 2017-06-09 NOTE — PROGRESS NOTE BEHAVIORAL HEALTH - NSBHCHARTREVIEWIMAGING_PSY_A_CORE FT
MEDICATIONS  (STANDING):  nicotine -  14 mG/24Hr(s) Patch 1patch Transdermal daily  nicotine - 21 mG/24Hr(s) Patch 1Patch Transdermal daily  lithium 300milliGRAM(s) Oral daily  lithium 600milliGRAM(s) Oral at bedtime  haloperidol     Tablet 10milliGRAM(s) Oral at bedtime  haloperidol     Tablet 5milliGRAM(s) Oral daily    MEDICATIONS  (PRN):  nicotine  Polacrilex Gum 2milliGRAM(s) Oral every 2 hours PRN breakthrough cravings  acetaminophen   Tablet 650milliGRAM(s) Oral every 6 hours PRN For Temp greater than 38.5 C (101.3 F)  haloperidol     Tablet 5milliGRAM(s) Oral every 6 hours PRN agitation  haloperidol    Injectable 5milliGRAM(s) IntraMuscular every 6 hours PRN severe agitation / aggressive behavior  diphenhydrAMINE   Capsule 50milliGRAM(s) Oral every 6 hours PRN Extrapyramidal symptoms  or prophylaxis / agitation  diphenhydrAMINE   Injectable 50milliGRAM(s) IntraMuscular every 6 hours PRN Extrapyramidal symptoms or prophylaxis / agitation  traZODone 50milliGRAM(s) Oral at bedtime PRN insomnia  benztropine 0.5milliGRAM(s) Oral two times a day PRN Extrapyramidal symptoms  LORazepam     Tablet 1milliGRAM(s) Oral every 8 hours PRN Agitation

## 2017-06-10 LAB
ALBUMIN SERPL ELPH-MCNC: 4.1 G/DL — SIGNIFICANT CHANGE UP (ref 3.3–5)
ALP SERPL-CCNC: 81 U/L — SIGNIFICANT CHANGE UP (ref 40–120)
ALT FLD-CCNC: 57 U/L — SIGNIFICANT CHANGE UP (ref 12–78)
ANION GAP SERPL CALC-SCNC: 6 MMOL/L — SIGNIFICANT CHANGE UP (ref 5–17)
AST SERPL-CCNC: 25 U/L — SIGNIFICANT CHANGE UP (ref 15–37)
BASOPHILS # BLD AUTO: 0.1 K/UL — SIGNIFICANT CHANGE UP (ref 0–0.2)
BASOPHILS NFR BLD AUTO: 1.2 % — SIGNIFICANT CHANGE UP (ref 0–2)
BILIRUB SERPL-MCNC: 0.4 MG/DL — SIGNIFICANT CHANGE UP (ref 0.2–1.2)
BUN SERPL-MCNC: 18 MG/DL — SIGNIFICANT CHANGE UP (ref 7–23)
CALCIUM SERPL-MCNC: 9.7 MG/DL — SIGNIFICANT CHANGE UP (ref 8.5–10.1)
CHLORIDE SERPL-SCNC: 107 MMOL/L — SIGNIFICANT CHANGE UP (ref 96–108)
CO2 SERPL-SCNC: 28 MMOL/L — SIGNIFICANT CHANGE UP (ref 22–31)
CREAT SERPL-MCNC: 0.96 MG/DL — SIGNIFICANT CHANGE UP (ref 0.5–1.3)
EOSINOPHIL # BLD AUTO: 0.4 K/UL — SIGNIFICANT CHANGE UP (ref 0–0.5)
EOSINOPHIL NFR BLD AUTO: 3.7 % — SIGNIFICANT CHANGE UP (ref 0–6)
GLUCOSE SERPL-MCNC: 100 MG/DL — HIGH (ref 70–99)
HCT VFR BLD CALC: 51 % — HIGH (ref 39–50)
HGB BLD-MCNC: 16.9 G/DL — SIGNIFICANT CHANGE UP (ref 13–17)
LITHIUM SERPL-MCNC: 0.5 MMOL/L — LOW (ref 0.6–1.2)
LYMPHOCYTES # BLD AUTO: 2.1 K/UL — SIGNIFICANT CHANGE UP (ref 1–3.3)
LYMPHOCYTES # BLD AUTO: 20.4 % — SIGNIFICANT CHANGE UP (ref 13–44)
MCHC RBC-ENTMCNC: 28.1 PG — SIGNIFICANT CHANGE UP (ref 27–34)
MCHC RBC-ENTMCNC: 33.1 GM/DL — SIGNIFICANT CHANGE UP (ref 32–36)
MCV RBC AUTO: 84.7 FL — SIGNIFICANT CHANGE UP (ref 80–100)
MONOCYTES # BLD AUTO: 0.9 K/UL — SIGNIFICANT CHANGE UP (ref 0–0.9)
MONOCYTES NFR BLD AUTO: 8.6 % — SIGNIFICANT CHANGE UP (ref 2–14)
NEUTROPHILS # BLD AUTO: 6.8 K/UL — SIGNIFICANT CHANGE UP (ref 1.8–7.4)
NEUTROPHILS NFR BLD AUTO: 66.2 % — SIGNIFICANT CHANGE UP (ref 43–77)
PLAT MORPH BLD: NORMAL — SIGNIFICANT CHANGE UP
PLATELET # BLD AUTO: 277 K/UL — SIGNIFICANT CHANGE UP (ref 150–400)
POTASSIUM SERPL-MCNC: 5 MMOL/L — SIGNIFICANT CHANGE UP (ref 3.5–5.3)
POTASSIUM SERPL-SCNC: 5 MMOL/L — SIGNIFICANT CHANGE UP (ref 3.5–5.3)
PROT SERPL-MCNC: 7.5 GM/DL — SIGNIFICANT CHANGE UP (ref 6–8.3)
RBC # BLD: 6.02 M/UL — HIGH (ref 4.2–5.8)
RBC # FLD: 12 % — SIGNIFICANT CHANGE UP (ref 10.3–14.5)
RBC BLD AUTO: NORMAL — SIGNIFICANT CHANGE UP
SODIUM SERPL-SCNC: 141 MMOL/L — SIGNIFICANT CHANGE UP (ref 135–145)
WBC # BLD: 10.3 K/UL — SIGNIFICANT CHANGE UP (ref 3.8–10.5)
WBC # FLD AUTO: 10.3 K/UL — SIGNIFICANT CHANGE UP (ref 3.8–10.5)

## 2017-06-10 RX ADMIN — HALOPERIDOL DECANOATE 10 MILLIGRAM(S): 100 INJECTION INTRAMUSCULAR at 20:15

## 2017-06-10 RX ADMIN — LITHIUM CARBONATE 600 MILLIGRAM(S): 300 TABLET, EXTENDED RELEASE ORAL at 20:16

## 2017-06-10 RX ADMIN — Medication 50 MILLIGRAM(S): at 21:17

## 2017-06-10 RX ADMIN — LITHIUM CARBONATE 300 MILLIGRAM(S): 300 TABLET, EXTENDED RELEASE ORAL at 09:19

## 2017-06-10 RX ADMIN — HALOPERIDOL DECANOATE 5 MILLIGRAM(S): 100 INJECTION INTRAMUSCULAR at 09:19

## 2017-06-11 RX ADMIN — HALOPERIDOL DECANOATE 10 MILLIGRAM(S): 100 INJECTION INTRAMUSCULAR at 20:25

## 2017-06-11 RX ADMIN — LITHIUM CARBONATE 600 MILLIGRAM(S): 300 TABLET, EXTENDED RELEASE ORAL at 20:25

## 2017-06-11 RX ADMIN — Medication 50 MILLIGRAM(S): at 18:04

## 2017-06-11 RX ADMIN — LITHIUM CARBONATE 300 MILLIGRAM(S): 300 TABLET, EXTENDED RELEASE ORAL at 09:42

## 2017-06-11 RX ADMIN — HALOPERIDOL DECANOATE 5 MILLIGRAM(S): 100 INJECTION INTRAMUSCULAR at 09:42

## 2017-06-11 RX ADMIN — Medication 50 MILLIGRAM(S): at 00:12

## 2017-06-11 RX ADMIN — Medication 50 MILLIGRAM(S): at 21:40

## 2017-06-12 RX ORDER — LITHIUM CARBONATE 300 MG/1
600 TABLET, EXTENDED RELEASE ORAL DAILY
Qty: 0 | Refills: 0 | Status: DISCONTINUED | OUTPATIENT
Start: 2017-06-12 | End: 2017-06-13

## 2017-06-12 RX ADMIN — Medication 50 MILLIGRAM(S): at 12:32

## 2017-06-12 RX ADMIN — Medication 50 MILLIGRAM(S): at 02:37

## 2017-06-12 RX ADMIN — LITHIUM CARBONATE 300 MILLIGRAM(S): 300 TABLET, EXTENDED RELEASE ORAL at 09:19

## 2017-06-12 RX ADMIN — HALOPERIDOL DECANOATE 5 MILLIGRAM(S): 100 INJECTION INTRAMUSCULAR at 09:20

## 2017-06-12 RX ADMIN — Medication 50 MILLIGRAM(S): at 23:46

## 2017-06-12 RX ADMIN — Medication 50 MILLIGRAM(S): at 22:05

## 2017-06-12 RX ADMIN — LITHIUM CARBONATE 600 MILLIGRAM(S): 300 TABLET, EXTENDED RELEASE ORAL at 20:33

## 2017-06-12 RX ADMIN — HALOPERIDOL DECANOATE 10 MILLIGRAM(S): 100 INJECTION INTRAMUSCULAR at 20:33

## 2017-06-12 NOTE — PROGRESS NOTE BEHAVIORAL HEALTH - NSBHCHARTREVIEWVS_PSY_A_CORE FT
Vital Signs Last 24 Hrs  T(C): 36.9, Max: 36.9 (06-05 @ 08:27)  T(F): 98.4, Max: 98.4 (06-05 @ 08:27)  HR: 89 (89 - 89)  BP: 142/78 (142/78 - 142/78)  BP(mean): --  RR: 16 (16 - 16)  SpO2: 99% (99% - 99%)
Vital Signs Last 24 Hrs  T(C): 36.4, Max: 36.4 (06-08 @ 08:47)  T(F): 97.5, Max: 97.5 (06-08 @ 08:47)  HR: 72 (72 - 72)  BP: 144/90 (144/90 - 144/90)  BP(mean): --  RR: 16 (16 - 16)  SpO2: 99% (99% - 99%)
Vital Signs Last 24 Hrs  T(C): 36.4, Max: 36.4 (06-12 @ 08:10)  T(F): 97.6, Max: 97.6 (06-12 @ 08:10)  HR: 71 (71 - 71)  BP: 139/90 (139/90 - 139/90)  BP(mean): --  RR: 16 (16 - 16)  SpO2: 99% (99% - 99%)
Vital Signs Last 24 Hrs  T(C): 36.4, Max: 36.4 (06-08 @ 08:47)  T(F): 97.5, Max: 97.5 (06-08 @ 08:47)  HR: 72 (72 - 72)  BP: 144/90 (144/90 - 144/90)  BP(mean): --  RR: 16 (16 - 16)  SpO2: 99% (99% - 99%)
Vital Signs Last 24 Hrs  T(C): 36.9, Max: 36.9 (06-05 @ 08:27)  T(F): 98.4, Max: 98.4 (06-05 @ 08:27)  HR: 89 (89 - 89)  BP: 142/78 (142/78 - 142/78)  BP(mean): --  RR: 16 (16 - 16)  SpO2: 99% (99% - 99%)

## 2017-06-12 NOTE — PROGRESS NOTE BEHAVIORAL HEALTH - OTHER
superficially cooperative as pt remains thought disordered and illogical and unable to provide full cooperation coherent , spontaneous but in rushed illogical spurts somewhat overly cheerful and inappropriately nonchalant less frequent joking, punning, then suddenly tearful slowly improving l pt denies though appears distracted and internally preoccupied at times.

## 2017-06-12 NOTE — PROGRESS NOTE BEHAVIORAL HEALTH - NSBHFUPINTERVALCCFT_PSY_A_CORE
Covering note   Pt claiming "Im really going to stay away from the cannabis."   Pt with good eye contact Alert Pt  claiming that he is "turning a new leaf"  Pt with a lithium level of 0.5 while at 900mg of the lithium  Pt is aware of the level and willing to have the dose increased to 600mg po bid. He remains on the haldol 15mg in total..          Writer reminded pt's father that though the pt is slowly clinically improving, he is not yet well enough to leave the hospital due to ongoing thought disorganization and affective lability. Also noted pt's ongoing impaired judgment and virtually no insight into the nature and severity of his illness and the need for consistent, possibly life long treatment.   On 6/7/17, this writer CONFIRMED  by phone with pt's father that ALL GUNS HAVE BEEN REMOVED FROM THE PT'S HOME."   Lithium level= 1.1 mmol/L ( 6/3/17) (  < 12 hrs after last dose)  Lithium level, CBC and CMP to be rechecked on 6/10/17.
" So how is the planning going for my discharge? I'm doing really good now. I just need a shot of Haldol in the butt every night and I'm good."    Pt  again suddenly  abruptly tearful and distressed, when speaking of pt's father, " He's my rock. My sole support. And I let him down."   Writer spoke  with the pt's father on 6/7/17 by phone  after earlier missed call efforts. Writer had also spoken earlier with pt's paternal grandparents  on several occasions on 6/5/17 and again on 6/6/17 to review pt clinical history and to contact pt's father ( cell phone #  [phone])  Pt's father expressed pleasant surprise with " how quickly Will has come along with this med treatment. I know he wants to leave and I don't want him to start going backwards because we won't let him and then he starts refusing his medication."        Writer reminded pt's father that though the pt is slowly clinically improving, he is not yet well enough to leave the hospital due to ongoing thought disorganization and affective lability. Also noted pt's ongoing impaired judgment and virtually no insight into the nature and severity of his illness and the need for consistent, possibly life long treatment.   On 6/7/17, this writer CONFIRMED  by phone with pt's father that ALL GUNS HAVE BEEN REMOVED FROM THE PT'S HOME."
' I'm doing good. How are you doing?"     Writer spoke  with the pt's father on 6/7/17 by phone  after earlier missed call efforts. Writer had also spoken earlier with pt's paternal grandparents  on several occasions on 6/5/17 and again on 6/6/17 to review pt clinical history and to contact pt's father ( cell phone #  [phone])  Pt's father expressed pleasant surprise with " how quickly Will has come along with this med treatment. I know he wants to leave and I don't want him to start going backwards because we won't let him and then he starts refusing his medication."        Writer reminded pt's father that though the pt is slowly clinically improving, he is not yet well enough to leave the hospital due to ongoing thought disorganization and affective lability. Also noted pt's ongoing impaired judgment and virtually no insight into the nature and severity of his illness and the need for consistent, possibly life long treatment.   On 6/7/17, this writer CONFIRMED  by phone with pt's father that ALL GUNS HAVE BEEN REMOVED FROM THE PT'S HOME."
' I'm doing very well."    Ativan being tapered due to pt significant h/o substance use disorders.   Writer spoke  with the pt's father on 6/7/17 by phone  after earlier missed call efforts. Writer had also spoken earlier with pt's paternal grandparents  on several occasions on 6/5/17 and again on 6/6/17 to review pt clinical history and to contact pt's father ( cell phone #  [phone])  Pt's father expressed pleasant surprise with " how quickly Will has come along with this med treatment. I know he wants to leave and I don't want him to start going backwards because we won't let him and then he starts refusing his medication."        Writer reminded pt's father that though the pt is slowly clinically improving, he is not yet well enough to leave the hospital due to ongoing thought disorganization and affective lability. Also noted pt's ongoing impaired judgment and virtually no insight into the nature and severity of his illness and the need for consistent, possibly life long treatment.   On 6/7/17, this writer CONFIRMED  by phone with pt's father that ALL GUNS HAVE BEEN REMOVED FROM THE PT'S HOME."   Lithium level= 1.1 mmol/L ( 6/3/17) (  < 12 hrs after last dose)  Lithium level, CBC and CMP to be rechecked on 6/10/17.
" So How's it going? "  Pt then abruptly tearful and distressed, stating, " I need to talk with my grandmother. She's not well. I told her I would call."   Writer spoke with pt's paternal grandparents  on several occasions on 6/5/17 and again on 6/6/17 to review pt clinical history and to contact pt's father ( cell phone #  [phone]) Writer still unable to reach pt's father ( mailbox full) . Writer reviewed above with pt's SW Ms Genevieve who has known the pt from prior pt admissions under similar circumstances of pt severe manic psychosis in the context of med noncompliance and comorbid substance use of THC and ETOH.

## 2017-06-12 NOTE — PROGRESS NOTE BEHAVIORAL HEALTH - PERCEPTIONS
Other/No abnormalities
No abnormalities/Other
No abnormalities/Other
Other/No abnormalities
Other/No abnormalities

## 2017-06-12 NOTE — PROGRESS NOTE BEHAVIORAL HEALTH - PROBLEM SELECTOR PROBLEM 4
Noncompliance with medication regimen

## 2017-06-12 NOTE — PROGRESS NOTE BEHAVIORAL HEALTH - SECONDARY DX1
Alcohol use disorder, moderate, in controlled environment

## 2017-06-12 NOTE — PROGRESS NOTE BEHAVIORAL HEALTH - SECONDARY DX3
Nonadherence to medical treatment

## 2017-06-12 NOTE — PROGRESS NOTE BEHAVIORAL HEALTH - THOUGHT PROCESS
Flight of ideas/Other/Tangential/Impaired reasoning/Disorganized/Illogical/Overinclusive/Circumstantial
Disorganized/Tangential/Flight of ideas/Illogical/Impaired reasoning/Circumstantial/Overinclusive
Disorganized/Tangential/Flight of ideas/Illogical/Overinclusive/Impaired reasoning/Circumstantial
Overinclusive/Circumstantial/Disorganized/Tangential/Flight of ideas/Illogical/Impaired reasoning
Tangential/Other/Overinclusive/Circumstantial/Flight of ideas/Disorganized/Impaired reasoning/Illogical

## 2017-06-12 NOTE — PROGRESS NOTE BEHAVIORAL HEALTH - SUMMARY
Patient is a 22 yo male with Bipolar I, prior psychiatric admissions, with no hx of suicide attempts/aggression/violence/serious legal issues presenting in a manic state with increased goal directed activity, inattentiveness, affect/mood lability, highly impulsive behavior including shopping sprees/spending sprees, poor sleep, paranoid thinking, decline in baseline functioning (unable to work; sent home by boss), with poor judgment and insight, with continued substance use (likely to have been more lately due to vacation in [place]) who is unable to safely meet his basic needs in his current state and needs inpatient level of care. Emergency 9.39 criteria met. Worthy to note that Patient's presentation is also influenced by his medication non-compliance for 6 months (even if he took medications again in the last week, it is not enough to compensate for this long of period of non-treatment), ongoing substance use which likely was more during his [place] coffee shop visits as well as the sleep changes that go along with a transatlantic trip/flight.
Patient is a 22 yo male with Bipolar I, prior psychiatric admissions, with no hx of suicide attempts/aggression/violence/serious legal issues presenting in a manic state with increased goal directed activity, inattentiveness, affect/mood lability, highly impulsive behavior including shopping sprees/spending sprees, poor sleep, paranoid thinking, decline in baseline functioning (unable to work; sent home by boss), with poor judgment and insight, with continued substance use (likely to have been more lately due to vacation in [place]) who is unable to safely meet his basic needs in his current state and needs inpatient level of care. Emergency 9.39 criteria met. Worthy to note that Patient's presentation is also influenced by his medication non-compliance for 6 months (even if he took medications again in the last week, it is not enough to compensate for this long of period of non-treatment), ongoing substance use which likely was more during his [place] coffee shop visits as well as the sleep changes that go along with a transatlantic trip/flight.
Patient is a 22 yo male with Bipolar I, prior psychiatric admissions, with no hx of suicide attempts/aggression/violence/serious legal issues presenting in a manic state with increased goal directed activity, inattentiveness, affect/mood lability, highly impulsive behavior including shopping sprees/spending sprees, poor sleep, paranoid thinking, decline in baseline functioning (unable to work; sent home by boss), with poor judgment and insight, with continued substance use (likely to have been more lately due to vacation in [place]) who is unable to safely meet his basic needs in his current state and needs inpatient level of care. Emergency 9.39 criteria met. Worthy to note that Patient's presentation is also influenced by his medication non-compliance for 6 months (even if he took medications again in the last week, it is not enough to compensate for this long of period of non-treatment), ongoing substance use which likely was more during his [place] coffee shop visits as well as the sleep changes that go along with a transatlantic trip/flight.
Patient is a 22 yo male with Bipolar I, prior psychiatric admissions, with no hx of suicide attempts/aggression/violence/serious legal issues presenting in a manic state with increased goal directed activity, inattentiveness, affect/mood lability, highly impulsive behavior including shopping sprees/spending sprees, poor sleep, paranoid thinking, decline in baseline functioning (unable to work; sent home by boss), with poor judgment and insight, with continued substance use (likely to have been more lately due to vacation in [place]) who is unable to safely meet his basic needs in his current state and needs inpatient level of care. Emergency 9.39 criteria met. Worthy to note that Patient's presentation is also influenced by his medication non-compliance for 6 months (even if he took medications again in the last week, it is not enough to compensate for this long of period of non-treatment), ongoing substance use which likely was more during his [place] coffee shop visits as well as the sleep changes that go along with a transatlantic trip/flight.
Patient is a 20 yo male with Bipolar I, prior psychiatric admissions, with no hx of suicide attempts/aggression/violence/serious legal issues presenting in a manic state with increased goal directed activity, inattentiveness, affect/mood lability, highly impulsive behavior including shopping sprees/spending sprees, poor sleep, paranoid thinking, decline in baseline functioning (unable to work; sent home by boss), with poor judgment and insight, with continued substance use (likely to have been more lately due to vacation in [place]) who is unable to safely meet his basic needs in his current state and needs inpatient level of care. Emergency 9.39 criteria met. Worthy to note that Patient's presentation is also influenced by his medication non-compliance for 6 months (even if he took medications again in the last week, it is not enough to compensate for this long of period of non-treatment), ongoing substance use which likely was more during his [place] coffee shop visits as well as the sleep changes that go along with a transatlantic trip/flight.

## 2017-06-12 NOTE — PROGRESS NOTE BEHAVIORAL HEALTH - NSBHCHARTREVIEWINVESTIGATE_PSY_A_CORE FT
EKG ( 6/4/17   VR= 83  QT /QTc= 372 / 437  possible LAE

## 2017-06-12 NOTE — PROGRESS NOTE BEHAVIORAL HEALTH - NSBHADMITIPREASON_PSY_A_CORE
Danger to self; mental illness expected to respond to inpatient care

## 2017-06-12 NOTE — PROGRESS NOTE BEHAVIORAL HEALTH - NSBHCHARTREVIEWIMAGING_PSY_A_CORE FT
MEDICATIONS  (STANDING):  nicotine -  14 mG/24Hr(s) Patch 1patch Transdermal daily  nicotine - 21 mG/24Hr(s) Patch 1Patch Transdermal daily  lithium 600milliGRAM(s) Oral at bedtime  haloperidol     Tablet 10milliGRAM(s) Oral at bedtime  haloperidol     Tablet 5milliGRAM(s) Oral daily  lithium 600milliGRAM(s) Oral daily

## 2017-06-12 NOTE — PROGRESS NOTE BEHAVIORAL HEALTH - PROBLEM SELECTOR PROBLEM 1
Bipolar affective disorder, manic, severe

## 2017-06-12 NOTE — PROGRESS NOTE BEHAVIORAL HEALTH - LANGUAGE
No abnormalities noted

## 2017-06-12 NOTE — PROGRESS NOTE BEHAVIORAL HEALTH - NSBHCONSORIP_PSY_A_CORE
Inpatient Admission...

## 2017-06-12 NOTE — PROGRESS NOTE BEHAVIORAL HEALTH - PROBLEM SELECTOR PLAN 1
1. Resume Lithium 300 mg po q am and 600 mg po qhs   2.Recheck [place] level ( 12 hr level), CMP, CBC , TSH  3.Pt encouraged to attend therapy groups as tolerated  4 Increase .Haldol  dose to 5 mg po q am and 10 mg po qhs  ( bipolar psychosis)  5.Pt gave verbal consent  to writer to contact pt's family and pt's outpatient provider Dr Sanders ( psychiatry)  ( voice mail box currently filled) and Dr Merry Klein ( therapist)  6. WRITER TO CONFIRM WITH PT'S FATHER THAT ALL GUNS IN THE HOME HAVE BEEN REMOVED ( several phone calls to pt's home regarding above)  7.Disposition planning ongoing
1. Continue Lithium 300 mg po q am and 600 mg po qhs   2.Recheck [place] level ( 12 hr level), CMP, CBC , TSH  3.Pt encouraged to attend therapy groups as tolerated  4  Continue Haldol   5 mg po q am and 10 mg po qhs  ( bipolar psychosis)  5.Pt gave verbal consent  to writer to contact pt's family and pt's outpatient provider Dr Sanders ( psychiatry)  ( voice mail box currently filled) and Dr Merry Klein ( therapist)  6. WRITER HAS  CONFIRMED  WITH PT'S FATHER THAT ALL GUNS IN THE HOME HAVE BEEN REMOVED (  6/7/17  phone call to pt's home regarding above)  7.Disposition planning ongoing
1. Continue Lithium 600 mg po q am and 600 mg po qhs   2. To recheck Lithium level ( 12 hr level), CMP, CBC  on 6/17/17  3.Pt encouraged to attend therapy groups as tolerated  4  Continue Haldol   5 mg po q am and 10 mg po qhs  ( bipolar psychosis)  5.Pt gave verbal consent  to writer to contact pt's family and pt's outpatient provider Dr Sanders ( psychiatry)  ( voice mail box currently filled) and Dr Merry Klein ( therapist)  6. WRITER HAS  CONFIRMED  WITH PT'S FATHER THAT ALL GUNS IN THE HOME HAVE BEEN REMOVED (  6/7/17  phone call to pt's home regarding above)  7.Disposition planning ongoing
1. Continue Lithium 300 mg po q am and 600 mg po qhs   2. To recheck Lithium level ( 12 hr level), CMP, CBC  on 6/10/17  3.Pt encouraged to attend therapy groups as tolerated  4  Continue Haldol   5 mg po q am and 10 mg po qhs  ( bipolar psychosis)  5.Pt gave verbal consent  to writer to contact pt's family and pt's outpatient provider Dr Sanders ( psychiatry)  ( voice mail box currently filled) and Dr Merry Klein ( therapist)  6. WRITER HAS  CONFIRMED  WITH PT'S FATHER THAT ALL GUNS IN THE HOME HAVE BEEN REMOVED (  6/7/17  phone call to pt's home regarding above)  7.Disposition planning ongoing
1. Continue Lithium 300 mg po q am and 600 mg po qhs   2.Recheck [place] level ( 12 hr level), CMP, CBC , TSH  3.Pt encouraged to attend therapy groups as tolerated  4  Continue Haldol   5 mg po q am and 10 mg po qhs  ( bipolar psychosis)  5.Pt gave verbal consent  to writer to contact pt's family and pt's outpatient provider Dr Sanders ( psychiatry)  ( voice mail box currently filled) and Dr Merry Klein ( therapist)  6. WRITER HAS  CONFIRMED  WITH PT'S FATHER THAT ALL GUNS IN THE HOME HAVE BEEN REMOVED (  6/7/17  phone call to pt's home regarding above)  7.Disposition planning ongoing

## 2017-06-12 NOTE — PROGRESS NOTE BEHAVIORAL HEALTH - NSBHADMITDANGERSELF_PSY_A_CORE
suicidal ideation with plan and means

## 2017-06-12 NOTE — PROGRESS NOTE BEHAVIORAL HEALTH - NSBHADMITIPDSM_PSY_A_CORE
see above for Axis I, II, III

## 2017-06-12 NOTE — PROGRESS NOTE BEHAVIORAL HEALTH - NSBHADMITIPBHPROVFT_PSY_A_CORE
Dr Mikhail Sanders called by writer on 6/5/17  and again on 6/6/17  ( tel [phone]) ( mail box filled and message could not be left but writer to call again)
Dr Mikhail Sanders called by writer on 6/5/17  and again on 6/6/17  ( tel [phone]) ( mail box filled and message could not be left but writer to call again)
Dr Mikhail Sanders called by writer on 6/5/17  and again on 6/6/17  ( tel [phone]) ( mail box filled and message could not be left but writer to call again)
Dr Mikhail Sanders called by writer on 6/5/17  and again on 6/6/17  ( tel [phone]) ( mail box filled and message could not be left but writer to call again)
Dr Mikhail Sanders called by writer on 6/5/17  and again on 6/6/17  ( tel [phone]) ( mail box filled and message could not be left but writer to call again)

## 2017-06-12 NOTE — PROGRESS NOTE BEHAVIORAL HEALTH - NSBHFUPINTERVALHXFT_PSY_A_CORE
Pt seen . Remains  with slowly improving manic psychosis  ,  with impaired judgment and virtually no insight into his illness and the need for treatment. Pt amenable to ongoing Haldol with plan to increase standing Haldol  for bipolar psychosis with plan to begin Haldol decanoate rather than Abilify Maintena . Plan to continue Lithium at 300 mg po qam and 600 mg po qhs for bipolar sean. Pt tried briefly but unable to attend or to participate meaningfully in  therapy groups at this time due to his ongoing severe manic psychosis. Pt is tolerating current med regimen without reported side effects and with slowly apparent clinical efficacy. Pt's judgment remains impaired and his insight virtually nil.  Plan to taper and DC Ativan due to pt h/o substance use d/o. To monitor pt BP due to occasional elevated DBP. No pt c/o c/p or SOB.
Pt seen . Remains manic, psychotic and with impaired judgment and virtually no insight into his illness and the need for treatment. Pt amenable to ongoing Haldol with plan to increase standing Haldol  for bipolar psychosis with plan to begin Haldol decanoate rather than Abilify Maintena . Plan to continue Lithium at 300 mg po qam and 600 mg po qhs for bipolar sean. Pt tried briefly but unable to attend therapy groups at this time due to his ongoing severe manic psychosis.
Pt seen . Remains manic, psychotic and with impaired judgment and virtually no insight into his illness and the need for treatment. Pt amenable to ongoing Haldol with plan to increase standing Haldol  for bipolar psychosis with plan to begin Haldol decanoate rather than Abilify Maintena . Plan to continue Lithium at 300 mg po qam and 600 mg po qhs for bipolar sean. Pt tried briefly but unable to attend therapy groups at this time due to his ongoing severe manic psychosis.

## 2017-06-12 NOTE — PROGRESS NOTE BEHAVIORAL HEALTH - NSBHADMITIPOBS_PSY_A_CORE
Enhanced supervision

## 2017-06-12 NOTE — PROGRESS NOTE BEHAVIORAL HEALTH - NSBHATTESTSEENBY_PSY_A_CORE
attending Psychiatrist without NP/Trainee

## 2017-06-12 NOTE — PROGRESS NOTE BEHAVIORAL HEALTH - THOUGHT CONTENT
Ruminations/Other/Delusions
Delusions/Other
Delusions/Ruminations/Other
Ruminations/Delusions/Other
Ruminations/Delusions/Other

## 2017-06-12 NOTE — PROGRESS NOTE BEHAVIORAL HEALTH - SECONDARY DX2
Cannabis use disorder, severe, in controlled environment

## 2017-06-13 VITALS
SYSTOLIC BLOOD PRESSURE: 139 MMHG | TEMPERATURE: 98 F | RESPIRATION RATE: 16 BRPM | HEART RATE: 65 BPM | OXYGEN SATURATION: 100 % | DIASTOLIC BLOOD PRESSURE: 82 MMHG

## 2017-06-13 RX ORDER — HALOPERIDOL DECANOATE 100 MG/ML
1 INJECTION INTRAMUSCULAR
Qty: 0 | Refills: 0 | COMMUNITY
Start: 2017-06-13

## 2017-06-13 RX ORDER — LITHIUM CARBONATE 300 MG/1
1 TABLET, EXTENDED RELEASE ORAL
Qty: 30 | Refills: 0 | OUTPATIENT
Start: 2017-06-13 | End: 2017-06-28

## 2017-06-13 RX ORDER — HALOPERIDOL DECANOATE 100 MG/ML
1 INJECTION INTRAMUSCULAR
Qty: 45 | Refills: 0 | OUTPATIENT
Start: 2017-06-13 | End: 2017-06-28

## 2017-06-13 RX ORDER — CLONAZEPAM 1 MG
1 TABLET ORAL
Qty: 0 | Refills: 0 | COMMUNITY

## 2017-06-13 RX ORDER — ARIPIPRAZOLE 15 MG/1
0 TABLET ORAL
Qty: 0 | Refills: 0 | COMMUNITY

## 2017-06-13 RX ORDER — HALOPERIDOL DECANOATE 100 MG/ML
5 INJECTION INTRAMUSCULAR THREE TIMES A DAY
Qty: 0 | Refills: 0 | Status: DISCONTINUED | OUTPATIENT
Start: 2017-06-13 | End: 2017-06-13

## 2017-06-13 RX ORDER — LITHIUM CARBONATE 300 MG/1
0 TABLET, EXTENDED RELEASE ORAL
Qty: 0 | Refills: 0 | COMMUNITY

## 2017-06-13 RX ORDER — TRAZODONE HCL 50 MG
1 TABLET ORAL
Qty: 15 | Refills: 0 | OUTPATIENT
Start: 2017-06-13 | End: 2017-06-28

## 2017-06-13 RX ADMIN — HALOPERIDOL DECANOATE 5 MILLIGRAM(S): 100 INJECTION INTRAMUSCULAR at 08:54

## 2017-06-13 RX ADMIN — LITHIUM CARBONATE 600 MILLIGRAM(S): 300 TABLET, EXTENDED RELEASE ORAL at 08:55

## 2017-06-13 NOTE — DISCHARGE NOTE BEHAVIORAL HEALTH - NSBHDCADMRISKMITFT_PSY_A_CORE
1.  pt is aware of the community support system of AA and NA meetings  2. family meeting during which the father and the pt acknowledged the patients' use of alcohol, cannabis, and of the recommendation of the treatment team that the pt is to stop using substances  3. education about the effects of substance on medication ,and resurfacing of the psychotic symptoms in the presence of drugs

## 2017-06-13 NOTE — DISCHARGE NOTE BEHAVIORAL HEALTH - NSBHDCCRISISPLAN1FT_PSY_A_CORE
Call 911, go to the nearest emergency room, call the crisis hotline number provided, tell your family

## 2017-06-13 NOTE — DISCHARGE NOTE BEHAVIORAL HEALTH - MEDICATION SUMMARY - MEDICATIONS TO STOP TAKING
I will STOP taking the medications listed below when I get home from the hospital:    lithium 300 mg oral capsule  --  by mouth    KlonoPIN 0.5 mg oral tablet  -- 1 tab(s) by mouth 3 times a day    Abilify  --  intramuscular

## 2017-06-13 NOTE — DISCHARGE NOTE BEHAVIORAL HEALTH - NSBHDCDXVALIDYESFT_PSY_A_CORE
Pt initially with labile intense mood and affect , agitation , poor sleep, thought flooding , high impulsivity, impaired judgement.

## 2017-06-13 NOTE — DISCHARGE NOTE BEHAVIORAL HEALTH - NSBHDCIDCAREGVRNAMEFT_PSY_A_CORE
Patients father is caregiver.  Met with Mr. Servin on day of discharge.  Discussed d/c plan.  He understands and is in agreement with plan.

## 2017-06-13 NOTE — DISCHARGE NOTE BEHAVIORAL HEALTH - HPI (INCLUDE ILLNESS QUALITY, SEVERITY, DURATION, TIMING, CONTEXT, MODIFYING FACTORS, ASSOCIATED SIGNS AND SYMPTOMS)
Pt high risk to self as he is in a manic episode, with increased goal directed activity, inattentiveness, thought flooding, affect/mood lability, highly impulsive behavior including shopping sprees/spending sprees, poor sleep, paranoid thinking, decline in baseline functioning (unable to work; sent home by boss), with poor judgment and insight, who is unable to safely meet his basic needs in his current state and needs. Pt was also noncompliant with medication and using cannabis    Please ref the behavioral assessment note.

## 2017-06-13 NOTE — DISCHARGE NOTE BEHAVIORAL HEALTH - NSBHDCRESPONSEFT_PSY_A_CORE
pt with good response to the haldol with resolution of the paranoid persecutory delusions.  Pt been on the lithium before.  Pt with hx of the abilify also as medication that he does well on. In comparison the haldol seems to resolve the paranoia faster.

## 2017-06-13 NOTE — DISCHARGE NOTE BEHAVIORAL HEALTH - MEDICATION SUMMARY - MEDICATIONS TO TAKE
I will START or STAY ON the medications listed below when I get home from the hospital:    traZODone 50 mg oral tablet  -- 1 tab(s) by mouth once a day (at bedtime), As needed, insomnia  -- Indication: For BIPOLAR DISORDER MANIC SEVERE    haloperidol 5 mg oral tablet  -- 1 tab(s) by mouth 3 times a day  -- Indication: For BIPOLAR DISORDER MANIC SEVERE    lithium 600 mg oral capsule  -- 1 cap(s) by mouth 2 times a day  -- Indication: For Bipolar affective disorder, manic, severe

## 2017-06-13 NOTE — DISCHARGE NOTE BEHAVIORAL HEALTH - NSBHDCCONDITIONFT_PSY_A_CORE
improved, with the pt being more focused, attentive and without any though disorder. Pt appears calm, no distress. He denies any hallucinations. No delusions elicited

## 2017-06-13 NOTE — DISCHARGE NOTE BEHAVIORAL HEALTH - NSBHDCMEDSFT_PSY_A_CORE
pt with good response to the haldol with resolution of the paranoid persecutory delusions.  Pt been on the lithium before.  Pt with hx of the abilify also as medication that he does well on. In comparison the haldol seems to resolve the paranoia faster

## 2017-06-13 NOTE — DISCHARGE NOTE BEHAVIORAL HEALTH - NSBHDCADDR1FT_A_CORE
[address]  [place] [address] [address]  [address]  [place] [address]  *Therapist has not called back.  Patient states he sees her on Sundays.  Will confirm this when she calls. [address]  [address]  [place] [address]

## 2017-06-16 DIAGNOSIS — F10.20 ALCOHOL DEPENDENCE, UNCOMPLICATED: ICD-10-CM

## 2017-06-16 DIAGNOSIS — Z91.19 PATIENT'S NONCOMPLIANCE WITH OTHER MEDICAL TREATMENT AND REGIMEN: ICD-10-CM

## 2017-06-16 DIAGNOSIS — E66.9 OBESITY, UNSPECIFIED: ICD-10-CM

## 2017-06-16 DIAGNOSIS — F31.13 BIPOLAR DISORDER, CURRENT EPISODE MANIC WITHOUT PSYCHOTIC FEATURES, SEVERE: ICD-10-CM

## 2017-06-16 DIAGNOSIS — F12.20 CANNABIS DEPENDENCE, UNCOMPLICATED: ICD-10-CM

## 2017-06-16 DIAGNOSIS — F17.210 NICOTINE DEPENDENCE, CIGARETTES, UNCOMPLICATED: ICD-10-CM

## 2020-01-03 ENCOUNTER — TRANSCRIPTION ENCOUNTER (OUTPATIENT)
Age: 25
End: 2020-01-03

## 2020-04-21 ENCOUNTER — EMERGENCY (EMERGENCY)
Facility: HOSPITAL | Age: 25
LOS: 0 days | Discharge: HOME | End: 2020-04-21
Payer: COMMERCIAL

## 2020-04-21 VITALS
OXYGEN SATURATION: 100 % | RESPIRATION RATE: 18 BRPM | SYSTOLIC BLOOD PRESSURE: 152 MMHG | TEMPERATURE: 99 F | DIASTOLIC BLOOD PRESSURE: 99 MMHG | HEART RATE: 90 BPM

## 2020-04-21 VITALS
RESPIRATION RATE: 18 BRPM | DIASTOLIC BLOOD PRESSURE: 102 MMHG | TEMPERATURE: 99 F | OXYGEN SATURATION: 100 % | HEART RATE: 115 BPM | SYSTOLIC BLOOD PRESSURE: 160 MMHG

## 2020-04-21 DIAGNOSIS — R50.9 FEVER, UNSPECIFIED: ICD-10-CM

## 2020-04-21 DIAGNOSIS — F90.9 ATTENTION-DEFICIT HYPERACTIVITY DISORDER, UNSPECIFIED TYPE: ICD-10-CM

## 2020-04-21 DIAGNOSIS — Z20.828 CONTACT WITH AND (SUSPECTED) EXPOSURE TO OTHER VIRAL COMMUNICABLE DISEASES: ICD-10-CM

## 2020-04-21 DIAGNOSIS — B34.9 VIRAL INFECTION, UNSPECIFIED: ICD-10-CM

## 2020-04-21 DIAGNOSIS — J34.89 OTHER SPECIFIED DISORDERS OF NOSE AND NASAL SINUSES: ICD-10-CM

## 2020-04-21 DIAGNOSIS — R05 COUGH: ICD-10-CM

## 2020-04-21 DIAGNOSIS — R53.1 WEAKNESS: ICD-10-CM

## 2020-04-21 DIAGNOSIS — R06.02 SHORTNESS OF BREATH: ICD-10-CM

## 2020-04-21 PROBLEM — F31.9 BIPOLAR DISORDER, UNSPECIFIED: Chronic | Status: ACTIVE | Noted: 2017-06-03

## 2020-04-21 LAB — SARS-COV-2 RNA SPEC QL NAA+PROBE: SIGNIFICANT CHANGE UP

## 2020-04-21 PROCEDURE — 87635 SARS-COV-2 COVID-19 AMP PRB: CPT

## 2020-04-21 PROCEDURE — 99283 EMERGENCY DEPT VISIT LOW MDM: CPT

## 2020-04-21 NOTE — ED ADULT NURSE NOTE - OBJECTIVE STATEMENT
Patient evaluated, treated, and discharged by PA. Refer to PA note for assessment.  Discharge instructions given verbally and understood by patient. Self-quarantine and COVID-19 information provided to patient. Unable to sign secondary to COVID-19 PUI. Pt gives verbal consent to receive results via text or email.

## 2020-04-21 NOTE — ED STATDOCS - OBJECTIVE STATEMENT
24 yr. old male PMH: ADHD currently off Adderal presents to ED with weakness, SOB, fever, cough, runny nose,  no body aches, no sore throat x 14 days. Pt recently exposed to COVID-19. Working at PT office.  Pt here for testing.

## 2020-04-21 NOTE — ED STATDOCS - PATIENT PORTAL LINK FT
You can access the FollowMyHealth Patient Portal offered by [facility] by registering at the following website: [email or web address]. By joining [facility]’s FollowMyHealth portal, you will also be able to view your health information using other applications (apps) compatible with our system.

## 2020-04-21 NOTE — ED STATDOCS - NS ED ROS FT
ROS: Constitutional- -fever, -chills.  Respiratory- +cough, +SOB  Cardiac- no chest pain, no palpitations, ENT- +rhinorrhea, no sore throat, no congestion.  Abdomen- No nausea, no vomiting, no diarrhea.  Urinary- no dysuria, no urgency, no frequency.  Skin- No rashes

## 2021-07-29 NOTE — ED ADULT NURSE REASSESSMENT NOTE - COMFORT CARE
assisted to bathroom
1 y/o M with PMHx of Craniosynostosis BIB mom presents to ED s/p fall out of crib.  Very well appearing male brought in s/p fall out of crib. Finding on exam small bruise on lower back. Will obtain urine to r/o kidney or abd injury. Per percen  low suspicion for intracranial bleed or skull fx. Will observe, and give PO prior to d/c.

## 2021-12-14 ENCOUNTER — TRANSCRIPTION ENCOUNTER (OUTPATIENT)
Age: 26
End: 2021-12-14

## 2022-01-25 ENCOUNTER — HOSPITAL ENCOUNTER (EMERGENCY)
Facility: HOSPITAL | Age: 27
Discharge: HOME | End: 2022-01-25
Payer: MEDICARE

## 2022-01-25 VITALS
DIASTOLIC BLOOD PRESSURE: 74 MMHG | HEART RATE: 69 BPM | SYSTOLIC BLOOD PRESSURE: 113 MMHG | TEMPERATURE: 99 F | OXYGEN SATURATION: 98 % | HEIGHT: 70 IN | BODY MASS INDEX: 33.64 KG/M2 | RESPIRATION RATE: 18 BRPM | WEIGHT: 235 LBS

## 2022-01-25 DIAGNOSIS — U07.1 COVID-19: Primary | ICD-10-CM

## 2022-01-25 LAB
FLUAV AG UPPER RESP QL IA.RAPID: NEGATIVE
FLUBV AG UPPER RESP QL IA.RAPID: NEGATIVE
SARS-COV+SARS-COV-2 AG RESP QL IA.RAPID: POSITIVE

## 2022-01-25 PROCEDURE — 87428 SARSCOV & INF VIR A&B AG IA: CPT | Performed by: NURSE PRACTITIONER

## 2022-01-25 PROCEDURE — 25000003 PHARM REV CODE 250: Performed by: NURSE PRACTITIONER

## 2022-01-25 PROCEDURE — 99283 EMERGENCY DEPT VISIT LOW MDM: CPT | Mod: ,,, | Performed by: NURSE PRACTITIONER

## 2022-01-25 PROCEDURE — 99283 EMERGENCY DEPT VISIT LOW MDM: CPT

## 2022-01-25 PROCEDURE — 99283 PR EMERGENCY DEPT VISIT,LEVEL III: ICD-10-PCS | Mod: ,,, | Performed by: NURSE PRACTITIONER

## 2022-01-25 RX ORDER — ONDANSETRON 4 MG/1
4 TABLET, FILM COATED ORAL EVERY 6 HOURS
Qty: 20 TABLET | Refills: 0 | Status: SHIPPED | OUTPATIENT
Start: 2022-01-25

## 2022-01-25 RX ORDER — ACETAMINOPHEN 500 MG
1000 TABLET ORAL
Status: COMPLETED | OUTPATIENT
Start: 2022-01-25 | End: 2022-01-25

## 2022-01-25 RX ADMIN — ACETAMINOPHEN 1000 MG: 500 TABLET, FILM COATED ORAL at 11:01

## 2022-01-26 NOTE — ED PROVIDER NOTES
Encounter Date: 1/25/2022       History     Chief Complaint   Patient presents with    Vomiting    Fever     27 y/o WM presents to the ED with complaints of fever that began today and 3 episodes of vomiting. Pt has not had any known contacts to covid-19. Pt is not vaccinated. He has no PMH. He denies any chest pain, cough, congestion, shortness of breath, or diarhea.         Review of patient's allergies indicates:  No Known Allergies  History reviewed. No pertinent past medical history.  History reviewed. No pertinent surgical history.  History reviewed. No pertinent family history.  Social History     Tobacco Use    Smoking status: Never Smoker    Smokeless tobacco: Never Used   Substance Use Topics    Alcohol use: Never    Drug use: Never     Review of Systems   Constitutional: Positive for fever.   HENT: Negative for sore throat.    Respiratory: Negative for shortness of breath.    Cardiovascular: Negative for chest pain.   Gastrointestinal: Positive for vomiting. Negative for nausea.   Genitourinary: Negative for dysuria.   Musculoskeletal: Negative for back pain.   Skin: Negative for rash.   Neurological: Negative for weakness.   Hematological: Does not bruise/bleed easily.       Physical Exam     Initial Vitals [01/25/22 2230]   BP Pulse Resp Temp SpO2   (!) 122/96 108 20 100 °F (37.8 °C) 95 %      MAP       --         Physical Exam    Nursing note and vitals reviewed.  Constitutional: He appears well-developed and well-nourished.   HENT:   Head: Normocephalic and atraumatic.   Eyes: Conjunctivae and EOM are normal. Pupils are equal, round, and reactive to light.   Neck: Neck supple.   Normal range of motion.  Cardiovascular: Normal rate, regular rhythm and normal heart sounds.   Pulmonary/Chest: Breath sounds normal. No respiratory distress. He has no wheezes. He has no rhonchi. He has no rales.   Abdominal: Abdomen is soft. Bowel sounds are normal. He exhibits no distension. There is no abdominal  tenderness. There is no rebound and no guarding.   Musculoskeletal:         General: No tenderness or edema. Normal range of motion.      Cervical back: Normal range of motion and neck supple.     Neurological: He is alert and oriented to person, place, and time. He has normal strength.   Skin: Skin is warm and dry.   Psychiatric: He has a normal mood and affect. Thought content normal.         Medical Screening Exam   See Full Note    ED Course   Procedures  Labs Reviewed   SARS-COV2 (COVID) W/ FLU ANTIGEN - Abnormal; Notable for the following components:       Result Value    COVID-19 Ag Positive (*)     All other components within normal limits    Narrative:     Positive SARS-CoV antigen results indicate the presence of viral antigens; correlation with patient history and presence of clinical signs & symptoms consistent with COVID-19 are necessary to determine infection status.          Imaging Results    None          Medications   acetaminophen tablet 1,000 mg (1,000 mg Oral Given 1/25/22 4478)                       Clinical Impression:   Final diagnoses:  [U07.1] COVID-19 (Primary)          ED Disposition Condition    Discharge Stable        ED Prescriptions     Medication Sig Dispense Start Date End Date Auth. Provider    ondansetron (ZOFRAN) 4 MG tablet Take 1 tablet (4 mg total) by mouth every 6 (six) hours. 20 tablet 1/25/2022  NELLY Mcclellan        Follow-up Information     Follow up With Specialties Details Why Contact Info    PCP  Schedule an appointment as soon as possible for a visit              NELLY Mcclellan  01/25/22 8286       NELLY Mcclellan  01/30/22 3046       NELLY Mcclellan  01/30/22 5381

## 2022-01-26 NOTE — DISCHARGE INSTRUCTIONS
Isolate for 5 days, then can go out in public as long as you wear a mask. Take Tylenol or Ibuprofen as needed for fever and body aches. Increase fluid intake. Take Vitamin C and Vitamin D. Take Over the counter cough medicine such as Dayquil and Nyquil. Return to the ED for any increase in symptoms or any worrisome of symptoms.

## 2022-01-26 NOTE — ED TRIAGE NOTES
C/o vomiting x 3 today.  Has had fever today and states he is burning up.  Muscular pain in R arm and leg. Has had a cough all day.

## 2022-01-27 ENCOUNTER — TELEPHONE (OUTPATIENT)
Dept: EMERGENCY MEDICINE | Facility: HOSPITAL | Age: 27
End: 2022-01-27
Payer: MEDICAID

## 2022-10-18 NOTE — ED BEHAVIORAL HEALTH ASSESSMENT NOTE - BODY HABITUS
Well nourished Cyclophosphamide Pregnancy And Lactation Text: This medication is Pregnancy Category D and it isn't considered safe during pregnancy. This medication is excreted in breast milk.

## 2022-10-31 ENCOUNTER — HOSPITAL ENCOUNTER (EMERGENCY)
Facility: HOSPITAL | Age: 27
Discharge: HOME | End: 2022-10-31
Payer: MEDICARE

## 2022-10-31 VITALS
RESPIRATION RATE: 20 BRPM | OXYGEN SATURATION: 100 % | SYSTOLIC BLOOD PRESSURE: 134 MMHG | TEMPERATURE: 99 F | BODY MASS INDEX: 34.8 KG/M2 | HEART RATE: 72 BPM | DIASTOLIC BLOOD PRESSURE: 96 MMHG | WEIGHT: 235 LBS | HEIGHT: 69 IN

## 2022-10-31 DIAGNOSIS — M10.00 ACUTE IDIOPATHIC GOUT, UNSPECIFIED SITE: ICD-10-CM

## 2022-10-31 DIAGNOSIS — M79.604 PAIN OF RIGHT LOWER EXTREMITY: Primary | ICD-10-CM

## 2022-10-31 LAB — URATE SERPL-MCNC: 7.5 MG/DL (ref 3.5–7.2)

## 2022-10-31 PROCEDURE — 99283 EMERGENCY DEPT VISIT LOW MDM: CPT

## 2022-10-31 PROCEDURE — 84550 ASSAY OF BLOOD/URIC ACID: CPT | Performed by: FAMILY MEDICINE

## 2022-10-31 PROCEDURE — 99283 EMERGENCY DEPT VISIT LOW MDM: CPT | Performed by: FAMILY MEDICINE

## 2022-10-31 PROCEDURE — 36415 COLL VENOUS BLD VENIPUNCTURE: CPT | Performed by: FAMILY MEDICINE

## 2022-10-31 RX ORDER — COLCHICINE 0.6 MG/1
0.6 TABLET ORAL DAILY
Qty: 10 TABLET | Refills: 0 | Status: SHIPPED | OUTPATIENT
Start: 2022-10-31 | End: 2022-11-10

## 2022-10-31 NOTE — ED PROVIDER NOTES
Encounter Date: 10/31/2022       History   No chief complaint on file.    Patient comes in with pain in his right foot right at the distal in of the foot the metatarsals..  No ankle pain.  No falls no trauma      Review of patient's allergies indicates:  No Known Allergies  No past medical history on file.  No past surgical history on file.  No family history on file.  Social History     Tobacco Use    Smoking status: Never    Smokeless tobacco: Never   Substance Use Topics    Alcohol use: Never    Drug use: Never     Review of Systems   Constitutional:  Positive for fatigue. Negative for fever.   HENT: Negative.  Negative for sore throat.    Eyes: Negative.    Respiratory: Negative.  Negative for shortness of breath.    Cardiovascular: Negative.  Negative for chest pain.   Gastrointestinal: Negative.  Negative for nausea.   Endocrine: Negative.    Genitourinary: Negative.  Negative for dysuria.   Musculoskeletal: Negative.  Negative for back pain.        Right foot pain   Skin: Negative.  Negative for rash.   Allergic/Immunologic: Negative.    Neurological: Negative.  Negative for weakness.   Hematological: Negative.  Does not bruise/bleed easily.   Psychiatric/Behavioral: Negative.       Physical Exam     Initial Vitals [10/31/22 1359]   BP Pulse Resp Temp SpO2   (!) 134/96 72 20 98.9 °F (37.2 °C) 100 %      MAP       --         Physical Exam    Constitutional: He appears well-developed and well-nourished.   HENT:   Head: Normocephalic and atraumatic.   Right Ear: External ear normal.   Left Ear: External ear normal.   Nose: Nose normal.   Mouth/Throat: Oropharynx is clear and moist.   Eyes: Conjunctivae and EOM are normal. Pupils are equal, round, and reactive to light.   Neck: Neck supple.   Normal range of motion.  Cardiovascular:  Normal rate, regular rhythm, normal heart sounds and intact distal pulses.           Pulmonary/Chest: Breath sounds normal.   Abdominal: Abdomen is soft. Bowel sounds are normal.    Genitourinary:    Prostate and penis normal.     Musculoskeletal:         General: Normal range of motion.      Cervical back: Normal range of motion and neck supple.      Comments: Pain with palpation of the right foot.     Neurological: He is alert and oriented to person, place, and time. He has normal strength and normal reflexes.   Skin: Skin is warm and dry.   Psychiatric: He has a normal mood and affect. His behavior is normal. Judgment and thought content normal.       Medical Screening Exam   See Full Note    ED Course   Procedures  Labs Reviewed   URIC ACID - Abnormal; Notable for the following components:       Result Value    Uric Acid 7.5 (*)     All other components within normal limits          Imaging Results    None          Medications - No data to display                    Clinical Impression:   Final diagnoses:  [M79.604] Pain of right lower extremity (Primary)  [M10.00] Acute idiopathic gout, unspecified site      ED Disposition Condition    Discharge Stable          ED Prescriptions       Medication Sig Dispense Start Date End Date Auth. Provider    colchicine (COLCRYS) 0.6 mg tablet Take 1 tablet (0.6 mg total) by mouth once daily. for 10 days 10 tablet 10/31/2022 11/10/2022 Kane Harvey DO          Follow-up Information    None          Kane Harvey DO  10/31/22 4687

## 2023-06-19 NOTE — PROGRESS NOTE BEHAVIORAL HEALTH - AFFECT CONGRUENCE
Danelle 24/7 aide/patient/daughter/caregiver
Not congruent

## 2023-08-09 ENCOUNTER — HOSPITAL ENCOUNTER (EMERGENCY)
Facility: HOSPITAL | Age: 28
Discharge: HOME | End: 2023-08-09
Attending: EMERGENCY MEDICINE
Payer: MEDICARE

## 2023-08-09 VITALS
BODY MASS INDEX: 34.8 KG/M2 | DIASTOLIC BLOOD PRESSURE: 76 MMHG | HEIGHT: 69 IN | RESPIRATION RATE: 18 BRPM | OXYGEN SATURATION: 95 % | TEMPERATURE: 98 F | SYSTOLIC BLOOD PRESSURE: 111 MMHG | HEART RATE: 85 BPM | WEIGHT: 235 LBS

## 2023-08-09 DIAGNOSIS — U07.1 COVID: ICD-10-CM

## 2023-08-09 DIAGNOSIS — R10.13 EPIGASTRIC PAIN: ICD-10-CM

## 2023-08-09 DIAGNOSIS — R07.9 CHEST PAIN: ICD-10-CM

## 2023-08-09 DIAGNOSIS — R11.2 NAUSEA AND VOMITING, UNSPECIFIED VOMITING TYPE: Primary | ICD-10-CM

## 2023-08-09 DIAGNOSIS — E87.6 HYPOKALEMIA: ICD-10-CM

## 2023-08-09 LAB
ALBUMIN SERPL BCP-MCNC: 4.4 G/DL (ref 3.5–5)
ALBUMIN/GLOB SERPL: 1.2 {RATIO}
ALP SERPL-CCNC: 81 U/L (ref 45–115)
ALT SERPL W P-5'-P-CCNC: 41 U/L (ref 16–61)
ANION GAP SERPL CALCULATED.3IONS-SCNC: 16 MMOL/L (ref 7–16)
AST SERPL W P-5'-P-CCNC: 25 U/L (ref 15–37)
BACTERIA #/AREA URNS HPF: ABNORMAL /HPF
BASOPHILS # BLD AUTO: 0.03 K/UL (ref 0–0.2)
BASOPHILS NFR BLD AUTO: 0.4 % (ref 0–1)
BILIRUB SERPL-MCNC: 0.9 MG/DL (ref ?–1.2)
BILIRUB UR QL STRIP: NEGATIVE
BUN SERPL-MCNC: 7 MG/DL (ref 7–18)
BUN/CREAT SERPL: 6 (ref 6–20)
CALCIUM SERPL-MCNC: 9 MG/DL (ref 8.5–10.1)
CHLORIDE SERPL-SCNC: 101 MMOL/L (ref 98–107)
CLARITY UR: CLEAR
CO2 SERPL-SCNC: 26 MMOL/L (ref 21–32)
COLOR UR: YELLOW
CREAT SERPL-MCNC: 1.12 MG/DL (ref 0.7–1.3)
DIFFERENTIAL METHOD BLD: ABNORMAL
EGFR (NO RACE VARIABLE) (RUSH/TITUS): 91 ML/MIN/1.73M2
EOSINOPHIL # BLD AUTO: 0.03 K/UL (ref 0–0.5)
EOSINOPHIL NFR BLD AUTO: 0.4 % (ref 1–4)
ERYTHROCYTE [DISTWIDTH] IN BLOOD BY AUTOMATED COUNT: 13.9 % (ref 11.5–14.5)
FLUAV AG UPPER RESP QL IA.RAPID: NEGATIVE
FLUBV AG UPPER RESP QL IA.RAPID: NEGATIVE
GLOBULIN SER-MCNC: 3.8 G/DL (ref 2–4)
GLUCOSE SERPL-MCNC: 107 MG/DL (ref 74–106)
GLUCOSE UR STRIP-MCNC: NEGATIVE MG/DL
HCT VFR BLD AUTO: 41.5 % (ref 40–54)
HGB BLD-MCNC: 14.8 G/DL (ref 13.5–18)
KETONES UR STRIP-SCNC: NEGATIVE MG/DL
LEUKOCYTE ESTERASE UR QL STRIP: NEGATIVE
LIPASE SERPL-CCNC: 76 U/L (ref 73–393)
LYMPHOCYTES # BLD AUTO: 1.29 K/UL (ref 1–4.8)
LYMPHOCYTES NFR BLD AUTO: 19.3 % (ref 27–41)
MCH RBC QN AUTO: 30.6 PG (ref 27–31)
MCHC RBC AUTO-ENTMCNC: 35.7 G/DL (ref 32–36)
MCV RBC AUTO: 85.7 FL (ref 80–96)
MONOCYTES # BLD AUTO: 1.03 K/UL (ref 0–0.8)
MONOCYTES NFR BLD AUTO: 15.4 % (ref 2–6)
MPC BLD CALC-MCNC: 11.5 FL (ref 9.4–12.4)
MUCOUS THREADS #/AREA URNS HPF: ABNORMAL /HPF
NEUTROPHILS # BLD AUTO: 4.32 K/UL (ref 1.8–7.7)
NEUTROPHILS NFR BLD AUTO: 64.5 % (ref 53–65)
NITRITE UR QL STRIP: NEGATIVE
PH UR STRIP: 5.5 PH UNITS
PLATELET # BLD AUTO: 202 K/UL (ref 150–400)
POTASSIUM SERPL-SCNC: 2.9 MMOL/L (ref 3.5–5.1)
PROT SERPL-MCNC: 8.2 G/DL (ref 6.4–8.2)
PROT UR QL STRIP: 30
RAPID GROUP A STREP: NEGATIVE
RBC # BLD AUTO: 4.84 M/UL (ref 4.6–6.2)
RBC # UR STRIP: NEGATIVE /UL
RBC #/AREA URNS HPF: ABNORMAL /HPF
SARS-COV+SARS-COV-2 AG RESP QL IA.RAPID: POSITIVE
SODIUM SERPL-SCNC: 140 MMOL/L (ref 136–145)
SP GR UR STRIP: >=1.03
SQUAMOUS #/AREA URNS LPF: ABNORMAL /LPF
TROPONIN I SERPL DL<=0.01 NG/ML-MCNC: 8.2 PG/ML
UROBILINOGEN UR STRIP-ACNC: 1 MG/DL
WBC # BLD AUTO: 6.7 K/UL (ref 4.5–11)
WBC #/AREA URNS HPF: ABNORMAL /HPF

## 2023-08-09 PROCEDURE — 93010 ELECTROCARDIOGRAM REPORT: CPT | Performed by: FAMILY MEDICINE

## 2023-08-09 PROCEDURE — 87880 STREP A ASSAY W/OPTIC: CPT | Performed by: EMERGENCY MEDICINE

## 2023-08-09 PROCEDURE — 96361 HYDRATE IV INFUSION ADD-ON: CPT

## 2023-08-09 PROCEDURE — 93005 ELECTROCARDIOGRAM TRACING: CPT

## 2023-08-09 PROCEDURE — 25000003 PHARM REV CODE 250: Performed by: EMERGENCY MEDICINE

## 2023-08-09 PROCEDURE — 87428 SARSCOV & INF VIR A&B AG IA: CPT | Performed by: EMERGENCY MEDICINE

## 2023-08-09 PROCEDURE — 81001 URINALYSIS AUTO W/SCOPE: CPT | Performed by: EMERGENCY MEDICINE

## 2023-08-09 PROCEDURE — 99285 EMERGENCY DEPT VISIT HI MDM: CPT | Mod: 25

## 2023-08-09 PROCEDURE — C9113 INJ PANTOPRAZOLE SODIUM, VIA: HCPCS | Performed by: EMERGENCY MEDICINE

## 2023-08-09 PROCEDURE — 84484 ASSAY OF TROPONIN QUANT: CPT | Performed by: EMERGENCY MEDICINE

## 2023-08-09 PROCEDURE — 83690 ASSAY OF LIPASE: CPT | Performed by: EMERGENCY MEDICINE

## 2023-08-09 PROCEDURE — 96374 THER/PROPH/DIAG INJ IV PUSH: CPT

## 2023-08-09 PROCEDURE — 96375 TX/PRO/DX INJ NEW DRUG ADDON: CPT

## 2023-08-09 PROCEDURE — 63600175 PHARM REV CODE 636 W HCPCS: Performed by: EMERGENCY MEDICINE

## 2023-08-09 PROCEDURE — 99284 EMERGENCY DEPT VISIT MOD MDM: CPT | Performed by: EMERGENCY MEDICINE

## 2023-08-09 PROCEDURE — 80053 COMPREHEN METABOLIC PANEL: CPT | Performed by: EMERGENCY MEDICINE

## 2023-08-09 PROCEDURE — 85025 COMPLETE CBC W/AUTO DIFF WBC: CPT | Performed by: EMERGENCY MEDICINE

## 2023-08-09 RX ORDER — ALBUTEROL SULFATE 90 UG/1
1-2 AEROSOL, METERED RESPIRATORY (INHALATION) EVERY 6 HOURS PRN
Qty: 18 G | Refills: 0 | Status: SHIPPED | OUTPATIENT
Start: 2023-08-09 | End: 2023-09-08

## 2023-08-09 RX ORDER — POTASSIUM CHLORIDE 20 MEQ/1
20 TABLET, EXTENDED RELEASE ORAL 2 TIMES DAILY
Qty: 10 TABLET | Refills: 0 | Status: SHIPPED | OUTPATIENT
Start: 2023-08-09 | End: 2023-08-14

## 2023-08-09 RX ORDER — FAMOTIDINE 20 MG/1
20 TABLET, FILM COATED ORAL 2 TIMES DAILY
Qty: 28 TABLET | Refills: 0 | Status: SHIPPED | OUTPATIENT
Start: 2023-08-09 | End: 2023-08-23

## 2023-08-09 RX ORDER — ONDANSETRON 2 MG/ML
4 INJECTION INTRAMUSCULAR; INTRAVENOUS
Status: COMPLETED | OUTPATIENT
Start: 2023-08-09 | End: 2023-08-09

## 2023-08-09 RX ORDER — AZITHROMYCIN 250 MG/1
TABLET, FILM COATED ORAL
Qty: 6 TABLET | Refills: 0 | Status: SHIPPED | OUTPATIENT
Start: 2023-08-09 | End: 2023-08-14

## 2023-08-09 RX ORDER — DEXAMETHASONE 6 MG/1
6 TABLET ORAL
Qty: 5 TABLET | Refills: 0 | Status: SHIPPED | OUTPATIENT
Start: 2023-08-09 | End: 2023-08-14

## 2023-08-09 RX ORDER — PANTOPRAZOLE SODIUM 40 MG/10ML
40 INJECTION, POWDER, LYOPHILIZED, FOR SOLUTION INTRAVENOUS
Status: COMPLETED | OUTPATIENT
Start: 2023-08-09 | End: 2023-08-09

## 2023-08-09 RX ADMIN — PANTOPRAZOLE SODIUM 40 MG: 40 INJECTION, POWDER, LYOPHILIZED, FOR SOLUTION INTRAVENOUS at 04:08

## 2023-08-09 RX ADMIN — POTASSIUM BICARBONATE 40 MEQ: 391 TABLET, EFFERVESCENT ORAL at 05:08

## 2023-08-09 RX ADMIN — SODIUM CHLORIDE 1000 ML: 9 INJECTION, SOLUTION INTRAVENOUS at 04:08

## 2023-08-09 RX ADMIN — ONDANSETRON 4 MG: 2 INJECTION INTRAMUSCULAR; INTRAVENOUS at 04:08

## 2023-08-09 NOTE — ED NOTES
EKG done while pt sitting in wheelchair.  Pt returned to waiting room after.  EKG ordered at 14:15 and done at 14:23.

## 2023-08-09 NOTE — ED PROVIDER NOTES
Encounter Date: 8/9/2023       History     Chief Complaint   Patient presents with    Chest Pain    Vomiting     Vomiting that started Monday and got chest pain Tuesday.     PT IS A 27 YR OLD WM WITH HX MALAISE AND FATIGUE WITH VOMITING X 1 ONSET 2 D AGO THEN PERSISTENT NAUSEA THEN NOTED EPIGASTRIC PAIN YESTERDAY. PT DENIES PAIN AT PRESENT.  PT DENIES COVID EXPOSURE KNOWN FEVER, CHILLS , PALPITATIONS, SYNCOPE OR ADDITIONAL COMPLAINTS.  PT HAD SIMILAR N/V 1/22 WITH COVID DX      Review of patient's allergies indicates:  No Known Allergies  No past medical history on file.  No past surgical history on file.  No family history on file.  Social History     Tobacco Use    Smoking status: Never    Smokeless tobacco: Never   Substance Use Topics    Alcohol use: Never    Drug use: Never     Review of Systems   Constitutional:  Positive for activity change and fatigue.   HENT: Negative.     Eyes: Negative.    Respiratory: Negative.     Cardiovascular: Negative.    Gastrointestinal:  Positive for abdominal pain, nausea and vomiting.   Endocrine: Negative.    Genitourinary:  Positive for difficulty urinating.   Musculoskeletal: Negative.    Skin: Negative.    Allergic/Immunologic: Negative.    Neurological: Negative.    Hematological: Negative.    Psychiatric/Behavioral: Negative.         Physical Exam     Initial Vitals [08/09/23 1548]   BP Pulse Resp Temp SpO2   136/76 95 18 98.2 °F (36.8 °C) 95 %      MAP       --         Physical Exam    Nursing note and vitals reviewed.  Constitutional: He appears well-developed and well-nourished. He is cooperative. He appears distressed.   HENT:   Head: Normocephalic and atraumatic.   Right Ear: External ear normal.   Left Ear: External ear normal.   Nose: Nose normal.   Eyes: Conjunctivae and EOM are normal. Pupils are equal, round, and reactive to light.   Neck: Trachea normal. Neck supple. No thyromegaly present. No tracheal deviation present.   Normal range of  motion.  Cardiovascular:  Normal rate, regular rhythm, normal heart sounds and intact distal pulses.           No murmur heard.  Pulses:       Radial pulses are 3+ on the right side and 3+ on the left side.        Dorsalis pedis pulses are 3+ on the right side and 3+ on the left side.   Pulmonary/Chest: Breath sounds normal. No stridor. No respiratory distress. He has no wheezes. He has no rhonchi. He has no rales. He exhibits no tenderness.   Abdominal: Abdomen is soft. Bowel sounds are normal. He exhibits no distension and no mass. There is no abdominal tenderness. There is no rebound and no guarding.   Musculoskeletal:         General: Normal range of motion.      Right shoulder: Normal.      Left shoulder: Normal.      Right upper arm: Normal.      Left upper arm: Normal.      Right elbow: Normal.      Left elbow: Normal.      Right forearm: Normal.      Left forearm: Normal.      Right wrist: Normal.      Left wrist: Normal.      Right hand: Normal.      Left hand: Normal.      Cervical back: Normal range of motion and neck supple.     Lymphadenopathy:     He has no cervical adenopathy.     He has no axillary adenopathy.   Neurological: He is alert and oriented to person, place, and time. He has normal strength. No cranial nerve deficit or sensory deficit. He displays a negative Romberg sign. GCS eye subscore is 4. GCS verbal subscore is 5. GCS motor subscore is 6.   Reflex Scores:       Brachioradialis reflexes are 2+ on the right side and 2+ on the left side.       Patellar reflexes are 2+ on the right side and 2+ on the left side.  Skin: Skin is warm and dry. Capillary refill takes less than 2 seconds. No erythema.   Psychiatric: He has a normal mood and affect. His speech is normal and behavior is normal. Judgment and thought content normal. Cognition and memory are normal.         Medical Screening Exam   See Full Note    ED Course   Procedures  Labs Reviewed   COMPREHENSIVE METABOLIC PANEL - Abnormal;  Notable for the following components:       Result Value    Potassium 2.9 (*)     Glucose 107 (*)     All other components within normal limits   SARS-COV2 (COVID) W/ FLU ANTIGEN - Abnormal; Notable for the following components:    COVID-19 Ag Positive (*)     All other components within normal limits    Narrative:     Positive SARS-CoV antigen results indicate the presence of viral antigens; correlation with patient history and presence of clinical signs & symptoms consistent with COVID-19 are necessary to determine infection status.   URINALYSIS - Abnormal; Notable for the following components:    Protein, UA 30 (*)     Specific Gravity, UA >=1.030 (*)     All other components within normal limits   CBC WITH DIFFERENTIAL - Abnormal; Notable for the following components:    Lymphocytes % 19.3 (*)     Monocytes % 15.4 (*)     Eosinophils % 0.4 (*)     Monocytes, Absolute 1.03 (*)     All other components within normal limits   URINALYSIS, MICROSCOPIC - Abnormal; Notable for the following components:    Mucus, UA Few (*)     All other components within normal limits   THROAT SCREEN, RAPID STREP - Normal   LIPASE - Normal   TROPONIN I - Normal   CBC W/ AUTO DIFFERENTIAL    Narrative:     The following orders were created for panel order CBC Auto Differential.  Procedure                               Abnormality         Status                     ---------                               -----------         ------                     CBC with Differential[[id number]]        Abnormal            Final result                 Please view results for these tests on the individual orders.     EKG Readings: (Independently Interpreted)   Initial Reading: No STEMI. Rhythm: Normal Sinus Rhythm. Heart Rate: 100. Ectopy: No Ectopy. Conduction: Normal. T Waves Flipped: V5 and V6. Clinical Impression: Normal Sinus Rhythm     ECG Results              EKG 12-lead (Final result)  Result time 09/14/23 16:18:08      Final result by  Interface[facility] (09/14/23 16:18:08)                   Narrative:    Test Reason : R07.9,    Vent. Rate : 100 BPM     Atrial Rate : 100 BPM     P-R Int : 140 ms          QRS Dur : 080 ms      QT Int : 318 ms       P-R-T Axes : 062 021 055 degrees     QTc Int : 410 ms    Normal sinus rhythm  Nonspecific T wave abnormality  Abnormal ECG  When compared with ECG of 01-DEC-1998 12:32,  Nonspecific T wave abnormality now evident in Inferior leads  Nonspecific T wave abnormality now evident in Lateral leads  Confirmed by Kane Harvey DO ([id number]) on 9/14/2023 4:17:59 PM    Referred By: [email or web address]   SELF           Confirmed By:Kane Harvey DO                                  Imaging Results              CT Renal Stone Study Abd Pelvis WO (Final result)  Result time 08/09/23 17:07:52      Final result by Duarte Maya DO (08/09/23 17:07:52)                   Impression:      No convincing acute CT findings.  No convincing urinary calculus or hydronephrosis.    The CT exam was performed using one or more of the following dose    reduction techniques- Automated exposure control, adjustment of the mA    and/or kV according to patient size, and/or use of iterative    reconstructed technique.    Point of Service: [facility]      Electronically signed by: Duarte Maya  Date:    08/09/2023  Time:    17:07               Narrative:    EXAMINATION:  CT RENAL STONE STUDY ABD PELVIS WO    CLINICAL HISTORY:  Flank pain, kidney stone suspected;back pain;    COMPARISON:  None    TECHNIQUE:  Multiple axial tomographic images of the abdomen and pelvis were obtained without the use of intravenous contrast.    FINDINGS:  Mild dependent changes of the lungs noted.    No worrisome focal hepatic abnormality demonstrated on submitted images.  Visualized gallbladder grossly unremarkable.  Visualized pancreas appears unremarkable.  Spleen grossly unremarkable.    Bilateral adrenal glands grossly unremarkable.   Bilateral kidneys appear grossly unremarkable.  Urinary bladder incompletely distended.  Minimal calcification the prostate.  Presumed pelvic phleboliths.    No convincing evidence of gastrointestinal obstruction or acute appendicitis.  Visualized osseous and surrounding soft tissue structures demonstrate no acute abnormality.                                       X-Ray Chest 1 View (Final result)  Result time 08/09/23 17:02:52      Final result by Duarte Maya DO (08/09/23 17:02:52)                   Impression:      No acute cardiopulmonary process demonstrated.    Point of Service: [facility]      Electronically signed by: Duarte Maya  Date:    08/09/2023  Time:    17:02               Narrative:    EXAMINATION:  XR CHEST 1 VIEW    CLINICAL HISTORY:  epigastric pain;    COMPARISON:  None    TECHNIQUE:  Frontal view/views of the chest.    FINDINGS:  Heart size appears within normal limits.  No focal consolidation, pleural effusion, or pneumothorax.  Visualized osseous and surrounding soft tissue structures demonstrate no acute abnormality.                                       Medications   sodium chloride 0.9% bolus 1,000 mL 1,000 mL (0 mLs Intravenous Stopped 8/9/23 1729)   ondansetron injection 4 mg (4 mg Intravenous Given 8/9/23 1615)   pantoprazole injection 40 mg (40 mg Intravenous Given 8/9/23 1620)   potassium bicarbonate disintegrating tablet 40 mEq (40 mEq Oral Given 8/9/23 1709)     Medical Decision Making:   Initial Assessment:   PT IS A 27 YR OLD WM WITH HX MALAISE AND FATIGUE WITH VOMITING X 1 ONSET 2 D AGO THEN PERSISTENT NAUSEA THEN NOTED EPIGASTRIC PAIN YESTERDAY. PT DENIES PAIN AT PRESENT.  PT DENIES COVID EXPOSURE KNOWN FEVER, CHILLS , PALPITATIONS, SYNCOPE OR ADDITIONAL COMPLAINTS.  PT HAD SIMILAR N/V 1/22 WITH COVID DX  Differential Diagnosis:   AGE, COVID, KIDNEY STONE  ABNORMAL LABS, CXR, CT  Clinical Tests:   Lab Tests: Ordered  Radiological Study: Ordered  Medical Tests:  Ordered  ED Management:  EXAM  LABS AS ABOVE COVID POS, K 2.9  CXR  CT ABD  EKG NSR    DC    INCREASE REST AND FLUIDS  MEDICATIONS AS DIRECTED  OVER THE COUNTER VITAMIN C,D,ZINC  OVER THE COUNTER TYLENOL AS NEEDED FOR FEVER OR PAININCREASE REST AND FLUIDS  MEDICATIONS AS DIRECTED  OVER THE COUNTER VITAMIN C,D,ZINC  OVER THE COUNTER TYLENOL AS NEEDED FOR FEVER OR PAININCREASE REST AND FLUIDS  MEDICATIONS AS DIRECTED  OVER THE COUNTER VITAMIN C,D,ZINC  OVER THE COUNTER TYLENOL AS NEEDED FOR FEVER OR PAIN                         Clinical Impression:   Final diagnoses:  [R07.9] Chest pain  [R11.2] Nausea and vomiting, unspecified vomiting type (Primary)  [U07.1] COVID  [E87.6] Hypokalemia  [R10.13] Epigastric pain        ED Disposition Condition    Discharge Stable          ED Prescriptions       Medication Sig Dispense Start Date End Date Auth. Provider    famotidine (PEPCID) 20 MG tablet () Take 1 tablet (20 mg total) by mouth 2 (two) times daily. for 14 days 28 tablet 2023 Ratna Dominguez MD    azithromycin (Z-ORLY) 250 MG tablet () Take 2 tablets by mouth on day 1; Take 1 tablet by mouth on days 2-5 6 tablet 2023 Ratna Dominguez MD    dexAMETHasone (DECADRON) 6 MG tablet () Take 1 tablet (6 mg total) by mouth daily with breakfast. for 5 days 5 tablet 2023 Ratna Dominguez MD    potassium chloride SA (K-DUR,KLOR-CON) 20 MEQ tablet () Take 1 tablet (20 mEq total) by mouth 2 (two) times daily. for 5 days 10 tablet 2023 Ratna Dominguez MD    albuterol (PROVENTIL/VENTOLIN HFA) 90 mcg/actuation inhaler () Inhale 1-2 puffs into the lungs every 6 (six) hours as needed for Wheezing. Rescue 18 g 2023 Ratna Dominguez MD          Follow-up Information       Follow up With Specialties Details Why Contact Info    Filomena Zhang MD Family Medicine In 1 week  [address]  [facility]  [place] [address]  [phone]               Ratna Dominguez MD  09/21/23 0034

## 2023-08-09 NOTE — DISCHARGE INSTRUCTIONS
INCREASE REST AND FLUIDS  MEDICATIONS AS DIRECTED  OVER THE COUNTER VITAMIN C,D,ZINC  OVER THE COUNTER TYLENOL AS NEEDED FOR FEVER OR PAIN

## 2023-08-09 NOTE — ED TRIAGE NOTES
This patient has been waiting in [facility] as there has not been any rooms available in the ed. He was brought back to ED at 1423 and EKG was done and it was viewed by dr hood. C/o vomiting that started Monday and then woke up Tuesday with epigastric pain that he calls chest pain. States he has only vomited x 1 Monday night but says he cant keep anything down.   Continue Regimen: .\\n\\nWhen condition flares \\n\\nBODY\\n\\ntriamcinolone acetonide 0.1 % topical cream \\nApply to affected areas on body twice a day for 2 weeks then start with:\\n\\nZoryve 0.3 % topical cream ( after finishing 2 weeks of Triamcinolone cream )\\nApply daily to psoriasis lesions\\n\\nFACE AND EARS\\n\\nhydrocortisone 2.5 % topical cream\\nApply to affected areas on face and ears twice a day for 7 days when condition flares Initiate Treatment: .\\n\\nCerave SA cream or Cerave Psoriasis cream apply once or twice a day to affected areas Render In Strict Bullet Format?: No Detail Level: Zone

## 2023-08-09 NOTE — ED NOTES
Ekg done while pt in chair dt no rooms in ED and shown to provider who states pt not having a STEMI, back out to waiting room until we can bring back

## 2023-10-23 ENCOUNTER — NON-APPOINTMENT (OUTPATIENT)
Age: 28
End: 2023-10-23

## 2024-05-14 ENCOUNTER — HOSPITAL ENCOUNTER (EMERGENCY)
Facility: HOSPITAL | Age: 29
Discharge: HOME | End: 2024-05-14
Payer: MEDICARE

## 2024-05-14 VITALS
SYSTOLIC BLOOD PRESSURE: 142 MMHG | RESPIRATION RATE: 18 BRPM | HEIGHT: 66 IN | BODY MASS INDEX: 37.77 KG/M2 | TEMPERATURE: 99 F | DIASTOLIC BLOOD PRESSURE: 83 MMHG | HEART RATE: 86 BPM | OXYGEN SATURATION: 93 % | WEIGHT: 235 LBS

## 2024-05-14 DIAGNOSIS — J06.9 UPPER RESPIRATORY TRACT INFECTION, UNSPECIFIED TYPE: Primary | ICD-10-CM

## 2024-05-14 LAB
FLUAV AG UPPER RESP QL IA.RAPID: NEGATIVE
FLUBV AG UPPER RESP QL IA.RAPID: NEGATIVE
GROUP A STREP MOLECULAR (OHS): NEGATIVE
SARS-COV+SARS-COV-2 AG RESP QL IA.RAPID: NEGATIVE

## 2024-05-14 PROCEDURE — 87651 STREP A DNA AMP PROBE: CPT | Performed by: NURSE PRACTITIONER

## 2024-05-14 PROCEDURE — 25000003 PHARM REV CODE 250: Performed by: NURSE PRACTITIONER

## 2024-05-14 PROCEDURE — 87428 SARSCOV & INF VIR A&B AG IA: CPT | Performed by: NURSE PRACTITIONER

## 2024-05-14 PROCEDURE — 99284 EMERGENCY DEPT VISIT MOD MDM: CPT | Mod: GF | Performed by: NURSE PRACTITIONER

## 2024-05-14 PROCEDURE — 99283 EMERGENCY DEPT VISIT LOW MDM: CPT

## 2024-05-14 RX ORDER — ONDANSETRON 4 MG/1
4 TABLET, ORALLY DISINTEGRATING ORAL EVERY 8 HOURS PRN
Qty: 30 TABLET | Refills: 0 | Status: SHIPPED | OUTPATIENT
Start: 2024-05-14

## 2024-05-14 RX ORDER — ONDANSETRON 4 MG/1
4 TABLET, ORALLY DISINTEGRATING ORAL
Status: COMPLETED | OUTPATIENT
Start: 2024-05-14 | End: 2024-05-14

## 2024-05-14 RX ORDER — IBUPROFEN 200 MG
600 TABLET ORAL
Status: COMPLETED | OUTPATIENT
Start: 2024-05-14 | End: 2024-05-14

## 2024-05-14 RX ADMIN — IBUPROFEN 600 MG: 200 TABLET, FILM COATED ORAL at 09:05

## 2024-05-14 RX ADMIN — ONDANSETRON 4 MG: 4 TABLET, ORALLY DISINTEGRATING ORAL at 09:05

## 2024-05-15 NOTE — DISCHARGE INSTRUCTIONS
Follow up with Primary Care Provider in 2-3 days if symptoms persist or worsen.  Continue all medications as directed.  Zofran as needed for nausea.  Over the counter medications as directed for symptom relief.  Drink plenty of fluid.  Warm water and salt gargle frequently throughout the day.  Return to emergency department for any future emergencies.

## 2024-05-15 NOTE — ED TRIAGE NOTES
Pt presents to ED with c/o vomiting and sore throat. Pt states he vomited once around 1300 but has not vomited since. Pt also c/o sore throat.

## 2024-05-15 NOTE — ED PROVIDER NOTES
Encounter Date: 5/14/2024       History     Chief Complaint   Patient presents with    Vomiting    Sore Throat     Kane Miller is a 28 y.o. White /male presenting to ED with cough, congestion, sore throat for 3 days. He also reports 1 episode of vomiting earlier today. Currently in NAD. Febrile. Otherwise, VSS at this time.      The history is provided by the patient.     Review of patient's allergies indicates:  No Known Allergies  History reviewed. No pertinent past medical history.  History reviewed. No pertinent surgical history.  No family history on file.  Social History     Tobacco Use    Smoking status: Never    Smokeless tobacco: Never   Substance Use Topics    Alcohol use: Never    Drug use: Never     Review of Systems   Constitutional:  Positive for chills and fever. Negative for activity change and fatigue.   HENT:  Positive for congestion, rhinorrhea and sore throat. Negative for ear pain, postnasal drip, sinus pressure, sinus pain, sneezing and trouble swallowing.    Eyes:  Negative for redness and itching.   Respiratory:  Positive for cough (occasional, non-productive). Negative for shortness of breath and wheezing.    Cardiovascular:  Negative for chest pain.   Gastrointestinal:  Positive for vomiting. Negative for abdominal pain, diarrhea and nausea.   Genitourinary:  Negative for dysuria, frequency and urgency.   Musculoskeletal:  Positive for myalgias. Negative for arthralgias, neck pain and neck stiffness.   Skin:  Negative for rash.   Neurological:  Negative for dizziness, weakness and headaches.   Psychiatric/Behavioral:  Negative for confusion. The patient is not nervous/anxious.    All other systems reviewed and are negative.      Physical Exam     Initial Vitals [05/14/24 2101]   BP Pulse Resp Temp SpO2   (!) 142/83 80 18 (!) 101 °F (38.3 °C) (!) 93 %      MAP       --         Physical Exam    Nursing note and vitals reviewed.  Constitutional: He appears well-developed and well-nourished.  He is not diaphoretic. No distress.   HENT:   Head: Normocephalic.   Right Ear: Hearing, tympanic membrane, external ear and ear canal normal.   Left Ear: Hearing, tympanic membrane, external ear and ear canal normal.   Nose: Mucosal edema and rhinorrhea present. Right sinus exhibits no maxillary sinus tenderness and no frontal sinus tenderness. Left sinus exhibits no maxillary sinus tenderness and no frontal sinus tenderness.   Mouth/Throat: Uvula is midline and mucous membranes are normal. Posterior oropharyngeal erythema (mild) present. No oropharyngeal exudate or posterior oropharyngeal edema.   Eyes: Conjunctivae are normal. Pupils are equal, round, and reactive to light. Right eye exhibits no discharge. Left eye exhibits no discharge.   Neck: Neck supple.   Normal range of motion.  Cardiovascular:  Normal rate, regular rhythm, normal heart sounds and intact distal pulses.           Pulmonary/Chest: Breath sounds normal. No respiratory distress.   Abdominal: Abdomen is soft. Bowel sounds are normal. He exhibits no distension. There is no abdominal tenderness. There is no rebound and no guarding.   Musculoskeletal:         General: Normal range of motion.      Cervical back: Normal range of motion and neck supple.     Lymphadenopathy:     He has no cervical adenopathy.   Neurological: He is alert and oriented to person, place, and time. GCS eye subscore is 4. GCS verbal subscore is 5. GCS motor subscore is 6.   Skin: Skin is warm and dry. Capillary refill takes less than 2 seconds.   Psychiatric: He has a normal mood and affect.         Medical Screening Exam   See Full Note    ED Course   Procedures  Labs Reviewed   STREP A BY MOLECULAR METHOD - Normal   SARS-COV2 (COVID) W/ FLU ANTIGEN - Normal    Narrative:     Negative SARS-CoV results should not be used as the sole basis for treatment or patient management decisions; negative results should be considered in the context of a patient's recent exposures,  history and the presene of clinical signs and symptoms consistent with COVID-19.  Negative results should be treated as presumptive and confirmed by molecular assay, if necessary for patient management.          Imaging Results    None          Medications   ondansetron disintegrating tablet 4 mg (4 mg Oral Given 5/14/24 2115)   ibuprofen tablet 600 mg (600 mg Oral Given 5/14/24 2115)     Medical Decision Making  The presentation of Kane Miller is consistent with upper respiratory infection, viral in nature. There were no clinical or ancillary findings suggestive of bronchitis, pneumonia, acute sinusitis, chronic sinusitis, or streptococcal pharyngitis, thus there was no indications for antibiotics.    He was given Motrin and Zofran in ED. Temp improved and he was able to yony PO challenge without nausea prior to discharge. Upon discharge, Kane Miller has no evidence of respiratory failure and is comfortable without respiratory distress. Additionally, Kane Miller has no evidence of airway compromise and is speaking in full/complete sentences without difficulty. Kane Miller meets outpatient treatment criteria.    Data Reviewed/Counseling: I have reviewed the patient's vital signs, nursing notes, and other relevant ancillary testing/information. I have had a detailed discussion with the patient regarding the historical points, examination findings, and any diagnostic results. I have also discussed the need for outpatient follow-up. I have recommended symptomatic therapy with over the counter remedies. Symptomatic therapy suggested: acetaminophen, ibuprofen, antihistamine-decongestant of choice, cough suppressant of choice. Increase fluids, use vaporizer, stay in steamy bathroom tid 15 min prn severe cough, Tylenol/Motrin as needed, rest, avoid smoky areas. Follow up with PCP in 2-3 days if sx persist.    Kane Miller has been counseled to return to the Emergency Department if symptoms worsen or if there are  any questions or concerns that arise while at home.    Kane Miller was encouraged to practice good infection control procedures to include but not limited to frequent hand washing to lessen likelihood of transmission of this infection.     Dx: URI    Amount and/or Complexity of Data Reviewed  Independent Historian:      Details: Kane Miller is a 28 y.o. White /male presenting to ED with cough, congestion, sore throat for 3 days. He also reports 1 episode of vomiting earlier today. Currently in NAD. Febrile. Otherwise, VSS at this time.    Labs: ordered.     Details: COVID/Flu/Strep- negative    Risk  OTC drugs.  Prescription drug management.                                      Clinical Impression:   Final diagnoses:  [J06.9] Upper respiratory tract infection, unspecified type (Primary)        ED Disposition Condition    Discharge Stable          ED Prescriptions       Medication Sig Dispense Start Date End Date Auth. Provider    ondansetron (ZOFRAN-ODT) 4 MG TbDL Take 1 tablet (4 mg total) by mouth every 8 (eight) hours as needed (nausea). 30 tablet 5/14/2024 -- Dominique Blum, NELLY          Follow-up Information    None          Dominique Blum, NELLY  05/14/24 2146       Dominique Blum, NELLY  05/14/24 2146

## 2024-05-16 NOTE — ADDENDUM NOTE
Encounter addended by: Elizabeth Ward RN on: 5/16/2024 9:26 AM   Actions taken: Demographics modified, Contacts section saved

## 2024-05-16 NOTE — ADDENDUM NOTE
Encounter addended by: Elizabeth Ward RN on: 5/16/2024 9:29 AM   Actions taken: Contacts section saved

## 2024-05-17 ENCOUNTER — TELEPHONE (OUTPATIENT)
Dept: EMERGENCY MEDICINE | Facility: HOSPITAL | Age: 29
End: 2024-05-17
Payer: MEDICARE

## 2025-02-07 ENCOUNTER — HOSPITAL ENCOUNTER (EMERGENCY)
Facility: HOSPITAL | Age: 30
Discharge: HOME | End: 2025-02-07
Payer: MEDICARE

## 2025-02-07 VITALS
TEMPERATURE: 100 F | SYSTOLIC BLOOD PRESSURE: 120 MMHG | DIASTOLIC BLOOD PRESSURE: 81 MMHG | HEIGHT: 66 IN | WEIGHT: 235 LBS | BODY MASS INDEX: 37.77 KG/M2 | HEART RATE: 93 BPM | RESPIRATION RATE: 18 BRPM | OXYGEN SATURATION: 96 %

## 2025-02-07 DIAGNOSIS — J10.1 INFLUENZA A: Primary | ICD-10-CM

## 2025-02-07 LAB
GROUP A STREP MOLECULAR (OHS): NEGATIVE
INFLUENZA A MOLECULAR (OHS): POSITIVE
INFLUENZA B MOLECULAR (OHS): NEGATIVE
SARS-COV-2 RDRP RESP QL NAA+PROBE: NEGATIVE

## 2025-02-07 PROCEDURE — 25000003 PHARM REV CODE 250: Performed by: PHYSICIAN ASSISTANT

## 2025-02-07 PROCEDURE — 87502 INFLUENZA DNA AMP PROBE: CPT | Performed by: PHYSICIAN ASSISTANT

## 2025-02-07 PROCEDURE — 99284 EMERGENCY DEPT VISIT MOD MDM: CPT | Mod: 25

## 2025-02-07 PROCEDURE — 99284 EMERGENCY DEPT VISIT MOD MDM: CPT | Mod: GF | Performed by: PHYSICIAN ASSISTANT

## 2025-02-07 PROCEDURE — 87651 STREP A DNA AMP PROBE: CPT | Performed by: PHYSICIAN ASSISTANT

## 2025-02-07 PROCEDURE — 87635 SARS-COV-2 COVID-19 AMP PRB: CPT | Performed by: PHYSICIAN ASSISTANT

## 2025-02-07 PROCEDURE — 63600175 PHARM REV CODE 636 W HCPCS: Mod: JZ,TB | Performed by: PHYSICIAN ASSISTANT

## 2025-02-07 PROCEDURE — 96372 THER/PROPH/DIAG INJ SC/IM: CPT | Performed by: PHYSICIAN ASSISTANT

## 2025-02-07 RX ORDER — OSELTAMIVIR PHOSPHATE 75 MG/1
75 CAPSULE ORAL 2 TIMES DAILY
Qty: 10 CAPSULE | Refills: 0 | Status: SHIPPED | OUTPATIENT
Start: 2025-02-07 | End: 2025-02-12

## 2025-02-07 RX ORDER — KETOROLAC TROMETHAMINE 30 MG/ML
30 INJECTION, SOLUTION INTRAMUSCULAR; INTRAVENOUS
Status: COMPLETED | OUTPATIENT
Start: 2025-02-07 | End: 2025-02-07

## 2025-02-07 RX ORDER — ACETAMINOPHEN 500 MG
1000 TABLET ORAL
Status: COMPLETED | OUTPATIENT
Start: 2025-02-07 | End: 2025-02-07

## 2025-02-07 RX ADMIN — ACETAMINOPHEN 1000 MG: 500 TABLET ORAL at 08:02

## 2025-02-07 RX ADMIN — KETOROLAC TROMETHAMINE 30 MG: 30 INJECTION, SOLUTION INTRAMUSCULAR at 09:02

## 2025-02-07 NOTE — ED PROVIDER NOTES
Encounter Date: 2/7/2025       History     Chief Complaint   Patient presents with    Fever    Generalized Body Aches    Cough     Patient is a 29-year-old male with history of flu-like symptoms.    He has no significant past medical or surgical history.      Review of patient's allergies indicates:  No Known Allergies  History reviewed. No pertinent past medical history.  History reviewed. No pertinent surgical history.  No family history on file.  Social History     Tobacco Use    Smoking status: Never    Smokeless tobacco: Never   Substance Use Topics    Alcohol use: Never    Drug use: Never     Review of Systems   Constitutional:  Positive for chills and fever.   HENT:  Positive for congestion.    Respiratory:  Positive for cough.    All other systems reviewed and are negative.      Physical Exam     Initial Vitals [02/07/25 0828]   BP Pulse Resp Temp SpO2   120/81 93 18 (!) 100.9 °F (38.3 °C) 96 %      MAP       --         Physical Exam    Nursing note and vitals reviewed.  Constitutional: He appears well-nourished. No distress.   HENT:   Head: Atraumatic.   Nose: Nose normal. Mouth/Throat: Oropharynx is clear and moist.   Neck: Neck supple.   Cardiovascular:  Normal rate and regular rhythm.           Pulmonary/Chest: No respiratory distress.   Abdominal: Abdomen is soft.   Musculoskeletal:      Cervical back: Neck supple.     Neurological: He is alert and oriented to person, place, and time.   Skin: Skin is dry.   Psychiatric: He has a normal mood and affect.         Medical Screening Exam   See Full Note    ED Course   Procedures  Labs Reviewed   INFLUENZA A & B BY MOLECULAR - Abnormal       Result Value    INFLUENZA A MOLECULAR Positive (*)     INFLUENZA B MOLECULAR  Negative     STREP A BY MOLECULAR METHOD - Normal    Group A Strep Molecular Negative     SARS-COV-2 RNA AMPLIFICATION, QUAL - Normal    SARS COV-2 Molecular Negative      Narrative:     Negative SARS-CoV results should not be used as the sole  basis for treatment or patient management decisions; negative results should be considered in the context of a patient's recent exposures, history and the presene of clinical signs and symptoms consistent with COVID-19.  Negative results should be treated as presumptive and confirmed by molecular assay, if necessary for patient management.          Imaging Results    None          Medications   ketorolac injection 30 mg (has no administration in time range)   acetaminophen tablet 1,000 mg (1,000 mg Oral Given 2/7/25 0830)     Medical Decision Making  Patient has flu A.    I will give him Toradol, he received Tylenol.    He will get Tamiflu, and self quarantine for 5 days    Amount and/or Complexity of Data Reviewed  Labs: ordered.    Risk  OTC drugs.  Prescription drug management.                                      Clinical Impression:   Final diagnoses:  [J10.1] Influenza A (Primary)        ED Disposition Condition    Discharge Stable          ED Prescriptions       Medication Sig Dispense Start Date End Date Auth. Provider    oseltamivir (TAMIFLU) 75 MG capsule Take 1 capsule (75 mg total) by mouth 2 (two) times daily. for 5 days 10 capsule 2/7/2025 2/12/2025 Chang Diaz PA          Follow-up Information    None          Chang Diaz PA  02/07/25 3503

## 2025-02-08 ENCOUNTER — TELEPHONE (OUTPATIENT)
Dept: EMERGENCY MEDICINE | Facility: HOSPITAL | Age: 30
End: 2025-02-08
Payer: MEDICARE

## 2025-02-08 NOTE — TELEPHONE ENCOUNTER
Pt states he isnt feeling well, instructed pt to take Tylenol/Motrin for pain and fever, continue the tamiflu, drink plenty of fluids, pt vu